# Patient Record
Sex: FEMALE | Race: BLACK OR AFRICAN AMERICAN | Employment: FULL TIME | URBAN - METROPOLITAN AREA
[De-identification: names, ages, dates, MRNs, and addresses within clinical notes are randomized per-mention and may not be internally consistent; named-entity substitution may affect disease eponyms.]

---

## 2017-05-30 ENCOUNTER — OP HISTORICAL/CONVERTED ENCOUNTER (OUTPATIENT)
Dept: OTHER | Age: 47
End: 2017-05-30

## 2017-07-11 ENCOUNTER — OP HISTORICAL/CONVERTED ENCOUNTER (OUTPATIENT)
Dept: OTHER | Age: 47
End: 2017-07-11

## 2018-02-27 ENCOUNTER — OP HISTORICAL/CONVERTED ENCOUNTER (OUTPATIENT)
Dept: OTHER | Age: 48
End: 2018-02-27

## 2018-03-01 ENCOUNTER — OP HISTORICAL/CONVERTED ENCOUNTER (OUTPATIENT)
Dept: OTHER | Age: 48
End: 2018-03-01

## 2018-08-21 ENCOUNTER — ED HISTORICAL/CONVERTED ENCOUNTER (OUTPATIENT)
Dept: OTHER | Age: 48
End: 2018-08-21

## 2019-03-21 LAB
MAMMOGRAPHY, EXTERNAL: NEGATIVE
PAP SMEAR, EXTERNAL: NEGATIVE

## 2020-07-22 ENCOUNTER — OFFICE VISIT (OUTPATIENT)
Dept: URGENT CARE | Age: 50
End: 2020-07-22

## 2020-07-22 VITALS — TEMPERATURE: 98 F | OXYGEN SATURATION: 97 % | HEART RATE: 86 BPM | RESPIRATION RATE: 17 BRPM

## 2020-07-22 DIAGNOSIS — Z20.822 ENCOUNTER FOR LABORATORY TESTING FOR COVID-19 VIRUS: Primary | ICD-10-CM

## 2020-07-22 NOTE — PROGRESS NOTES
This patient was seen in [facility] at [address] [place] [address] [facility]gent [facility] while in their vehicle due to COVID-19 pandemic with PPE and focused examination in order to decrease community viral transmission. The patient/guardian gave verbal consent to treat. The history is provided by the patient. Asymptomatic  [place]  History reviewed. No pertinent past medical history. History reviewed. No pertinent surgical history. History reviewed. No pertinent family history.      Social History     Socioeconomic History    Marital status:      Spouse name: Not on file    Number of children: Not on file    Years of education: Not on file    Highest education level: Not on file   Occupational History    Not on file   Social Needs    Financial resource strain: Not on file    Food insecurity     Worry: Not on file     Inability: Not on file    Transportation needs     Medical: Not on file     Non-medical: Not on file   Tobacco Use    Smoking status: Smoker, Current Status Unknown    Smokeless tobacco: Never Used   Substance and Sexual Activity    Alcohol use: Not on file    Drug use: Not on file    Sexual activity: Not on file   Lifestyle    Physical activity     Days per week: Not on file     Minutes per session: Not on file    Stress: Not on file   Relationships    Social connections     Talks on phone: Not on file     Gets together: Not on file     Attends Mandaen service: Not on file     Active member of club or organization: Not on file     Attends meetings of clubs or organizations: Not on file     Relationship status: Not on file    Intimate partner violence     Fear of current or ex partner: Not on file     Emotionally abused: Not on file     Physically abused: Not on file     Forced sexual activity: Not on file   Other Topics Concern    Not on file   Social History Narrative    Not on file                ALLERGIES: Patient has no known allergies. Review of Systems   All other systems reviewed and are negative. Vitals:    07/22/20 1251   Pulse: 86   Resp: 17   Temp: 98 °F (36.7 °C)   SpO2: 97%       Physical Exam  Vitals signs and nursing note reviewed. Constitutional:       General: She is not in acute distress. Appearance: She is not ill-appearing. Pulmonary:      Effort: Pulmonary effort is normal. No respiratory distress. Breath sounds: No wheezing. MDM    Procedures        ICD-10-CM ICD-9-CM    1. Encounter for laboratory testing for COVID-19 virus  Z11.59 V73.89 NOVEL CORONAVIRUS (COVID-19)        Tested for Covid-19 and advised to quarantine until result comes back. No orders of the defined types were placed in this encounter. No results found for any visits on 07/22/20. The patients condition was discussed with the patient and they understand. The patient is to follow up with primary care doctor. If signs and symptoms become worse the pt is to go to the ER. The patient is to take medications as prescribed.

## 2020-07-25 LAB — SARS-COV-2, NAA: NOT DETECTED

## 2020-07-25 NOTE — PROGRESS NOTES
Pt verified with two person identifiers. Pt notified test result(S). Pt understands results and/or instructions as per provider.

## 2020-08-19 ENCOUNTER — OFFICE VISIT (OUTPATIENT)
Dept: OBGYN CLINIC | Age: 50
End: 2020-08-19
Payer: COMMERCIAL

## 2020-08-19 VITALS
SYSTOLIC BLOOD PRESSURE: 118 MMHG | DIASTOLIC BLOOD PRESSURE: 80 MMHG | HEIGHT: 61 IN | WEIGHT: 216 LBS | BODY MASS INDEX: 40.78 KG/M2

## 2020-08-19 DIAGNOSIS — Z01.419 GYNECOLOGIC EXAM NORMAL: Primary | ICD-10-CM

## 2020-08-19 DIAGNOSIS — Z12.31 VISIT FOR SCREENING MAMMOGRAM: Primary | ICD-10-CM

## 2020-08-19 DIAGNOSIS — Z12.4 ENCOUNTER FOR SCREENING FOR MALIGNANT NEOPLASM OF CERVIX: ICD-10-CM

## 2020-08-19 DIAGNOSIS — N95.1 PERIMENOPAUSAL: ICD-10-CM

## 2020-08-19 PROCEDURE — 77067 SCR MAMMO BI INCL CAD: CPT | Performed by: OBSTETRICS & GYNECOLOGY

## 2020-08-19 PROCEDURE — 99396 PREV VISIT EST AGE 40-64: CPT | Performed by: OBSTETRICS & GYNECOLOGY

## 2020-08-19 RX ORDER — GLUCOSAMINE/CHONDR SU A SOD 750-600 MG
TABLET ORAL
COMMUNITY
End: 2022-04-16

## 2020-08-19 NOTE — PROGRESS NOTES
Cha Jasso is a 54 y.o. female, , No LMP recorded. (Menstrual status: Menopause). , who presents today for the following:  Chief Complaint   Patient presents with    Annual Exam        No Known Allergies    Current Outpatient Medications   Medication Sig    omega 3-dha-epa-fish oil 100-160-1,000 mg cap Take  by mouth.  Biotin 2,500 mcg cap Take  by mouth.  elderberry fruit-honey 0.7-3 gram/7.5 mL liqd Take  by mouth. No current facility-administered medications for this visit. Past Medical History:   Diagnosis Date    Hypercholesterolemia     Hypertension        Past Surgical History:   Procedure Laterality Date    HX CARPAL TUNNEL RELEASE      HX  SECTION      LAP,TUBAL CAUTERY         History reviewed. No pertinent family history. Social History     Socioeconomic History    Marital status:      Spouse name: Not on file    Number of children: Not on file    Years of education: Not on file    Highest education level: Not on file   Occupational History    Not on file   Social Needs    Financial resource strain: Not on file    Food insecurity     Worry: Not on file     Inability: Not on file    Transportation needs     Medical: Not on file     Non-medical: Not on file   Tobacco Use    Smoking status: Never Smoker    Smokeless tobacco: Never Used   Substance and Sexual Activity    Alcohol use:  Yes    Drug use: Never    Sexual activity: Yes     Partners: Male     Birth control/protection: Surgical   Lifestyle    Physical activity     Days per week: Not on file     Minutes per session: Not on file    Stress: Not on file   Relationships    Social connections     Talks on phone: Not on file     Gets together: Not on file     Attends Yazdanism service: Not on file     Active member of club or organization: Not on file     Attends meetings of clubs or organizations: Not on file     Relationship status: Not on file    Intimate partner violence     Fear of current or ex partner: Not on file     Emotionally abused: Not on file     Physically abused: Not on file     Forced sexual activity: Not on file   Other Topics Concern    Not on file   Social History Narrative    Not on file         HPI  Annual    Review of Systems   Constitutional: Negative. Respiratory: Negative. Cardiovascular: Negative. Gastrointestinal: Negative. Genitourinary: Negative. Musculoskeletal: Negative. Skin: Negative. Neurological: Negative. Endo/Heme/Allergies: Negative. Psychiatric/Behavioral: Negative. All other systems reviewed and are negative. /80 (BP 1 Location: Left arm, BP Patient Position: Sitting)   Ht 5' 1\" (1.549 m)   Wt 216 lb (98 kg)   BMI 40.81 kg/m²    OBGyn Exam     Constitutional:     General Appearance: healthy-appearing, well-nourished, and well-developed; Level of Distress: NAD. Ambulation: ambulating normally. Psychiatric:   Insight: good judgement. Mental Status: normal mood and affect and active and alert. Orientation: to time, place, and person. Memory: recent memory normal and remote memory normal.     Head: Head: normocephalic and atraumatic. Neck:   Neck: supple, FROM, trachea midline, and no masses. Lymph Nodes: no cervical LAD, supraclavicular LAD, axillary LAD, or inguinal LAD. Thyroid: no enlargement or nodules and non-tender. Lungs:   Respiratory effort: no dyspnea. Auscultation: no wheezing, rales/crackles, or rhonchi and breath sounds normal, good air movement, and CTA except as noted. Cardiovascular:   Heart Auscultation: normal S1 and S2; no murmurs, rubs, or gallops; and RRR. Pulses including femoral / pedal: normal throughout. Breast: Breast: no masses or abnormal secretions and normal appearance. Abdomen:  Inspection and Palpation: no tenderness, guarding, masses, rebound tenderness, or CVA tenderness and non-distended.        Female :   External genitalia: no lesions or rash and normal.   Vagina: moist mucosa;  Cervix: no discharge, inflammation, or cervical motion tenderness   Uterus: midline, smooth, and non-tender; normal size   Adnexae: no adnexal mass or tenderness and size WNL. Bladder and Urethra: normal bladder and urethra (except where noted). Musculoskeletal[de-identified]   Motor Strength and Tone: normal tone and motor strength. Joints, Bones, and Muscles: no contractures, malalignment, tenderness, or bony abnormalities and normal movement of all extremities. Extremities: no cyanosis, edema, varicosities, or palpable cord. Skin:   Inspection and palpation: no rash, lesions, ulcer, induration, nodules, jaundice, or abnormal nevi and good turgor. Nails: normal.     Back: Thoracolumbar Appearance: normal curvature. 1. Gynecologic exam normal    - PAP IG, CT-NG, RFX APTIMA HPV ASCUS ([id number], [id number]))    2. Encounter for screening for malignant neoplasm of cervix      3. Perimenopausal    - FSH AND LH  - ESTRADIOL, SERUM  - PROGESTERONE  - TSH 3RD GENERATION  - HIV 1/2 AG/AB, 4TH GENERATION,W RFLX CONFIRM  - RPR;  Future

## 2020-08-24 LAB
C TRACH RRNA CVX QL NAA+PROBE: NEGATIVE
CYTOLOGIST CVX/VAG CYTO: NORMAL
CYTOLOGY CVX/VAG DOC CYTO: NORMAL
CYTOLOGY CVX/VAG DOC THIN PREP: NORMAL
DX ICD CODE: NORMAL
LABCORP, 190119: NORMAL
Lab: NORMAL
N GONORRHOEA RRNA CVX QL NAA+PROBE: NEGATIVE
OTHER STN SPEC: NORMAL
STAT OF ADQ CVX/VAG CYTO-IMP: NORMAL

## 2020-08-25 LAB
ESTRADIOL SERPL HS-MCNC: 7.3 PG/ML
FSH SERPL-ACNC: 91.4 MIU/ML
HIV 1+2 AB+HIV1 P24 AG SERPL QL IA: NON REACTIVE
LH SERPL-ACNC: 57.1 MIU/ML
PROGEST SERPL-MCNC: 0.2 NG/ML
TSH SERPL DL<=0.005 MIU/L-ACNC: 6.46 UIU/ML (ref 0.45–4.5)

## 2020-08-28 ENCOUNTER — NURSE TRIAGE (OUTPATIENT)
Dept: OBGYN CLINIC | Age: 50
End: 2020-08-28

## 2020-08-28 NOTE — TELEPHONE ENCOUNTER
Patient called for lab results. Advised her I would speak with Dr Flores Favorite regarding her [facility] and . She requests her results be forwarded to her PCP.

## 2020-08-31 ENCOUNTER — VIRTUAL VISIT (OUTPATIENT)
Dept: FAMILY MEDICINE CLINIC | Age: 50
End: 2020-08-31
Payer: COMMERCIAL

## 2020-08-31 DIAGNOSIS — Z13.1 SCREENING FOR DIABETES MELLITUS: ICD-10-CM

## 2020-08-31 DIAGNOSIS — K21.9 GASTROESOPHAGEAL REFLUX DISEASE WITHOUT ESOPHAGITIS: Primary | ICD-10-CM

## 2020-08-31 DIAGNOSIS — K59.00 CONSTIPATION, UNSPECIFIED CONSTIPATION TYPE: ICD-10-CM

## 2020-08-31 DIAGNOSIS — R79.89 ELEVATED TSH: ICD-10-CM

## 2020-08-31 DIAGNOSIS — Z13.220 SCREENING FOR HYPERLIPIDEMIA: ICD-10-CM

## 2020-08-31 PROBLEM — Z98.84 HISTORY OF BARIATRIC SURGERY: Status: ACTIVE | Noted: 2020-08-31

## 2020-08-31 PROBLEM — I10 HYPERTENSION: Status: ACTIVE | Noted: 2020-08-31

## 2020-08-31 PROBLEM — E78.00 HYPERCHOLESTEREMIA: Status: ACTIVE | Noted: 2020-08-31

## 2020-08-31 PROCEDURE — 99213 OFFICE O/P EST LOW 20 MIN: CPT | Performed by: NURSE PRACTITIONER

## 2020-08-31 RX ORDER — POLYETHYLENE GLYCOL 3350 17 G/17G
POWDER, FOR SOLUTION ORAL
Qty: 60 PACKET | Refills: 5 | Status: SHIPPED | OUTPATIENT
Start: 2020-08-31 | End: 2022-04-16

## 2020-08-31 RX ORDER — PANTOPRAZOLE SODIUM 40 MG/1
40 TABLET, DELAYED RELEASE ORAL DAILY
Qty: 90 TAB | Refills: 1 | Status: SHIPPED | OUTPATIENT
Start: 2020-08-31 | End: 2020-10-12 | Stop reason: SINTOL

## 2020-08-31 RX ORDER — BISMUTH SUBSALICYLATE 262 MG
1 TABLET,CHEWABLE ORAL DAILY
COMMUNITY

## 2020-08-31 NOTE — PATIENT INSTRUCTIONS
Get your labs done in a fasting state. We will let you know the results via [employer]. Take the pantoprazole as we discussed. Let me know if you have questions or concerns.

## 2020-08-31 NOTE — PROGRESS NOTES
Subjective  Amberly Manrique is a 54 y.o. female. HPI: 54 yr old female presented for virtual appt via [employer] technology. Pt had a number of concerns today. Pt stated that she was seen by me a few years ago but I could not find any notes for our office. Did see records for visits w/ Dr. Lizet Godoy. Had a PAP and some labs done on 8/19/2020. She is having mild hot flashes. Menopause confirmed via lab tests per GYNE. She was taking HRT but she developed leg cramps which she attributed to the HRT so stopped. Pt stated that her TSH was elevated - per GYNE. She does not have hx of hypothyroidism. Will recheck lab. Dxes of hypercholesteremia. No labs in a long time so current status unknown. Will check labs. Pt stated that she is having epigastric pain w/ occasional burning in her throat for the last month. No trigger foods identified- happens w/ all kind of food. Has a hx of bariatric surgery done by Dr. Sybil Galo 4-5 yrs ago and has not had any f/u. Current BMI 40.8. Pt stated that she is currently working w/ a  and a nutritionist to get back on track w/ her weight. Also has hx of chronic constipation. Last BM 3 days ago. Has not tried any OTC treatments. Stated she is trying to increase her intake of fruits and vegetables. Review of Systems   Constitutional: Negative for chills, fever and malaise/fatigue. HENT: Negative for ear pain and sore throat. Eyes: Positive for blurred vision. Pt stated occurs after she is on computer for a long time. She intends to make an appt for an eye exam in near future. Respiratory: Negative for cough, hemoptysis, shortness of breath and wheezing. Cardiovascular: Negative for chest pain, palpitations and leg swelling. Gastrointestinal: Positive for abdominal pain, constipation and heartburn. Negative for blood in stool, diarrhea, nausea and vomiting. Genitourinary: Negative for dysuria. Musculoskeletal: Negative.     Skin: Negative for itching and rash. Neurological: Negative for dizziness, tingling, sensory change and headaches. Psychiatric/Behavioral: Negative. Objective  Physical Exam  Constitutional:       Appearance: Normal appearance. Comments: Morbidly obese   HENT:      Head: Normocephalic. Right Ear: External ear normal.      Left Ear: External ear normal.      Nose: Nose normal.   Eyes:      Conjunctiva/sclera: Conjunctivae normal.   Neck:      Musculoskeletal: Neck supple. Comments: Neck area appeared inlarged  Pulmonary:      Effort: Pulmonary effort is normal.   Musculoskeletal: Normal range of motion. Comments: Of visible extremities   Neurological:      General: No focal deficit present. Mental Status: She is alert and oriented to person, place, and time. Psychiatric:         Mood and Affect: Mood normal.         Behavior: Behavior normal.         Thought Content: Thought content normal.         Judgment: Judgment normal.      Assessment & Plan    ICD-10-CM ICD-9-CM    1. Gastroesophageal reflux disease without esophagitis  K21.9 530.81 pantoprazole (PROTONIX) 40 mg tablet      METABOLIC PANEL, COMPREHENSIVE      CBC WITH AUTOMATED DIFF      RETICULOCYTE COUNT      REFERRAL TO BARIATRIC SURGERY   2. Elevated TSH  R79.89 794.5 TSH 3RD GENERATION      T4, FREE   3. Constipation, unspecified constipation type  K59.00 564.00 polyethylene glycol (MIRALAX) 17 gram packet   4.  Screening for hyperlipidemia  Z13.220 V77.91 LIPID PANEL   5. Screening for diabetes mellitus  Z13.1 V77.1 HEMOGLOBIN A1C WITH EAG     Everette Busch NP

## 2020-09-03 LAB
ALBUMIN SERPL-MCNC: 4.2 G/DL (ref 3.8–4.8)
ALBUMIN/GLOB SERPL: 1.3 {RATIO} (ref 1.2–2.2)
ALP SERPL-CCNC: 61 IU/L (ref 39–117)
ALT SERPL-CCNC: 12 IU/L (ref 0–32)
AST SERPL-CCNC: 20 IU/L (ref 0–40)
BASOPHILS # BLD AUTO: 0.1 X10E3/UL (ref 0–0.2)
BASOPHILS NFR BLD AUTO: 1 %
BILIRUB SERPL-MCNC: 0.4 MG/DL (ref 0–1.2)
BUN SERPL-MCNC: 12 MG/DL (ref 6–24)
BUN/CREAT SERPL: 12 (ref 9–23)
CALCIUM SERPL-MCNC: 9.4 MG/DL (ref 8.7–10.2)
CHLORIDE SERPL-SCNC: 102 MMOL/L (ref 96–106)
CHOLEST SERPL-MCNC: 215 MG/DL (ref 100–199)
CO2 SERPL-SCNC: 25 MMOL/L (ref 20–29)
CREAT SERPL-MCNC: 0.97 MG/DL (ref 0.57–1)
EOSINOPHIL # BLD AUTO: 0.3 X10E3/UL (ref 0–0.4)
EOSINOPHIL NFR BLD AUTO: 4 %
ERYTHROCYTE [DISTWIDTH] IN BLOOD BY AUTOMATED COUNT: 12.7 % (ref 11.7–15.4)
EST. AVERAGE GLUCOSE BLD GHB EST-MCNC: 114 MG/DL
GLOBULIN SER CALC-MCNC: 3.3 G/DL (ref 1.5–4.5)
GLUCOSE SERPL-MCNC: 91 MG/DL (ref 65–99)
HBA1C MFR BLD: 5.6 % (ref 4.8–5.6)
HCT VFR BLD AUTO: 44.1 % (ref 34–46.6)
HDLC SERPL-MCNC: 56 MG/DL
HGB BLD-MCNC: 14.6 G/DL (ref 11.1–15.9)
IMM GRANULOCYTES # BLD AUTO: 0 X10E3/UL (ref 0–0.1)
IMM GRANULOCYTES NFR BLD AUTO: 0 %
LDLC SERPL CALC-MCNC: 145 MG/DL (ref 0–99)
LYMPHOCYTES # BLD AUTO: 2.9 X10E3/UL (ref 0.7–3.1)
LYMPHOCYTES NFR BLD AUTO: 37 %
MCH RBC QN AUTO: 29.9 PG (ref 26.6–33)
MCHC RBC AUTO-ENTMCNC: 33.1 G/DL (ref 31.5–35.7)
MCV RBC AUTO: 90 FL (ref 79–97)
MONOCYTES # BLD AUTO: 0.4 X10E3/UL (ref 0.1–0.9)
MONOCYTES NFR BLD AUTO: 5 %
NEUTROPHILS # BLD AUTO: 4.2 X10E3/UL (ref 1.4–7)
NEUTROPHILS NFR BLD AUTO: 53 %
PLATELET # BLD AUTO: 220 X10E3/UL (ref 150–450)
POTASSIUM SERPL-SCNC: 4.4 MMOL/L (ref 3.5–5.2)
PROT SERPL-MCNC: 7.5 G/DL (ref 6–8.5)
RBC # BLD AUTO: 4.89 X10E6/UL (ref 3.77–5.28)
RETICS/RBC NFR AUTO: 1.3 % (ref 0.6–2.6)
SODIUM SERPL-SCNC: 141 MMOL/L (ref 134–144)
T4 FREE SERPL-MCNC: 1.07 NG/DL (ref 0.82–1.77)
TRIGL SERPL-MCNC: 81 MG/DL (ref 0–149)
TSH SERPL DL<=0.005 MIU/L-ACNC: 8.72 UIU/ML (ref 0.45–4.5)
VLDLC SERPL CALC-MCNC: 14 MG/DL (ref 5–40)
WBC # BLD AUTO: 7.9 X10E3/UL (ref 3.4–10.8)

## 2020-09-07 DIAGNOSIS — E03.9 ACQUIRED HYPOTHYROIDISM: Primary | ICD-10-CM

## 2020-09-07 RX ORDER — LEVOTHYROXINE SODIUM 50 UG/1
50 TABLET ORAL
Qty: 90 TAB | Refills: 1 | Status: SHIPPED | OUTPATIENT
Start: 2020-09-07 | End: 2021-03-11

## 2020-09-08 NOTE — PROGRESS NOTES
Repeat TSH also elevated as the one done about 2 weeks ago was. Will send in an order for thyroid medication and an order for repeat thyroid testing in about 4 weeks. Will ask staff to mail to pt.

## 2020-09-14 DIAGNOSIS — N95.1 MENOPAUSAL AND FEMALE CLIMACTERIC STATES: Primary | ICD-10-CM

## 2020-09-14 RX ORDER — NORETHINDRONE ACETATE AND ETHINYL ESTRADIOL 1; .02 MG/1; MG/1
1 TABLET ORAL DAILY
Qty: 28 TAB | Refills: 11 | Status: SHIPPED | OUTPATIENT
Start: 2020-09-14 | End: 2021-03-08 | Stop reason: ALTCHOICE

## 2020-09-14 NOTE — PROGRESS NOTES
Thyroid medication was increased on 9/7/2020. Endorphin message sent today to verify she has picked up medication.

## 2020-09-21 ENCOUNTER — TELEPHONE (OUTPATIENT)
Dept: SURGERY | Age: 50
End: 2020-09-21

## 2020-09-21 ENCOUNTER — OFFICE VISIT (OUTPATIENT)
Dept: SURGERY | Age: 50
End: 2020-09-21
Payer: COMMERCIAL

## 2020-09-21 VITALS
DIASTOLIC BLOOD PRESSURE: 73 MMHG | TEMPERATURE: 98.6 F | HEIGHT: 61 IN | SYSTOLIC BLOOD PRESSURE: 103 MMHG | BODY MASS INDEX: 41.44 KG/M2 | OXYGEN SATURATION: 98 % | HEART RATE: 65 BPM | WEIGHT: 219.5 LBS

## 2020-09-21 DIAGNOSIS — Z98.84 STATUS POST BARIATRIC SURGERY: ICD-10-CM

## 2020-09-21 DIAGNOSIS — E66.01 OBESITY, MORBID (HCC): ICD-10-CM

## 2020-09-21 DIAGNOSIS — Z98.84 STATUS POST BARIATRIC SURGERY: Primary | ICD-10-CM

## 2020-09-21 PROCEDURE — 99213 OFFICE O/P EST LOW 20 MIN: CPT | Performed by: SURGERY

## 2020-09-21 RX ORDER — NORETHINDRONE ACETATE AND ETHINYL ESTRADIOL 1MG-20(21)
KIT ORAL
COMMUNITY
End: 2022-04-16

## 2020-09-21 NOTE — LETTER
9/21/20 Patient: Will Lung YOB: 1970 Date of Visit: 9/21/2020 Danitza Bell NP 
[address] [place] [id number] VIA In Basket Dear Danitza Bell NP, Thank you for referring Ms. Luisa Bella to [address] [place] [address] for evaluation. My notes for this consultation are attached. If you have questions, please do not hesitate to call me. I look forward to following your patient along with you. Sincerely, Anny Orona MD

## 2020-09-21 NOTE — PROGRESS NOTES
[facility]  [address], [address] [facility]  [phone]    Bariatric Surgery Follow up    Patient Name: Dung Rodriguez (15 y.o., female)    Patient Address: [place] [address]    PCP: Aspen Guerrero NP     Patient contact numbers:     Home Phone  Work Phone  Mobile    [phone]   Patient Allergies:  Patient has no known allergies. Temp: 98.6 °F (37 °C) (09/21/20 1418) Pulse (Heart Rate): 65 (09/21/20 1418) Resp: (not recorded) BP: 103/73 (09/21/20 1418) O2 Sat (%): 98 % (09/21/20 1418) Weight: 219 lb 8 oz (99.6 kg) (09/21/20 1418)     Current Outpatient Medications   Medication Sig Dispense Refill    norethindrone-ethinyl estradiol (Junel FE 1/20, 28,) 1 mg-20 mcg (21)/75 mg (7) tab Take  by mouth.  levothyroxine (SYNTHROID) 50 mcg tablet Take 1 Tab by mouth Daily (before breakfast). Indications: a condition with low thyroid hormone levels 90 Tab 1    pantoprazole (PROTONIX) 40 mg tablet Take 1 Tab by mouth daily. 90 Tab 1    polyethylene glycol (MIRALAX) 17 gram packet Take 1 packet (17 Gm) per label directions 2 x day until BMs normalize then cut back dosing as needed to maintain at least every other day BMs. Stay well hydrated. 60 Packet 5    multivitamin (ONE A DAY) tablet Take 1 Tab by mouth daily.  omega 3-dha-epa-fish oil 100-160-1,000 mg cap Take  by mouth.  Biotin 2,500 mcg cap Take  by mouth.  norethindrone-ethinyl estradiol (Loestrin 1/20, 21,) 1-20 mg-mcg tablet Take 1 Tab by mouth daily for 336 days.  28 Tab 11        Patient Active Problem List   Diagnosis Code    Status post bariatric surgery Z98.84    Hypertension I10    Hypercholesteremia E78.00    Obesity, morbid ([address] E66.01       Family History   Problem Relation Age of Onset    Heart Disease Father        Social History     Socioeconomic History    Marital status:      Spouse name: Not on file    Number of children: Not on file    Years of education: Not on file    Highest education level: Not on file   Occupational History    Not on file   Social Needs    Financial resource strain: Not on file    Food insecurity     Worry: Not on file     Inability: Not on file    Transportation needs     Medical: Not on file     Non-medical: Not on file   Tobacco Use    Smoking status: Never Smoker    Smokeless tobacco: Never Used   Substance and Sexual Activity    Alcohol use: Yes    Drug use: Never    Sexual activity: Yes     Partners: Male     Birth control/protection: Surgical   Lifestyle    Physical activity     Days per week: Not on file     Minutes per session: Not on file    Stress: Not on file   Relationships    Social connections     Talks on phone: Not on file     Gets together: Not on file     Attends Jew service: Not on file     Active member of club or organization: Not on file     Attends meetings of clubs or organizations: Not on file     Relationship status: Not on file    Intimate partner violence     Fear of current or ex partner: Not on file     Emotionally abused: Not on file     Physically abused: Not on file     Forced sexual activity: Not on file   Other Topics Concern    Not on file   Social History Narrative    Not on file       Past Surgical History:   Procedure Laterality Date    HX CARPAL TUNNEL RELEASE      HX  SECTION      HX GI      gastric sleeves    LAP,TUBAL CAUTERY         Past Medical History:   Diagnosis Date    Hypercholesterolemia     Hypertension            History of Present Illness  2020  Patient presents for follow-up after sleeve gastrectomy on 10/4/2016. She moved and recently returned back to the area which is the reason for her being lost to follow-up. Over the past few months she has noticed worsening reflux. This is resulted in her increasing her carbohydrate intake. This has subsequently resulted in about a 20 pound weight gain.   She wakes up in the middle of the night choking on acid. She has been on Protonix without significant improvement of her symptoms. She has also had issues with her thyroid and is started thyroid replacement therapy. She also is having issues with her menses and menopause which is currently being controlled and managed by her gynecologist.  She is taking her vitamins and supplements as directed. She is on a strict diet as prescribed by her nutritionist.  She is also working with a . Review of Systems  Review of Systems   Constitutional: Negative for chills and fever. HENT: Negative for ear pain, hearing loss, nosebleeds, sinus pain and tinnitus. Eyes: Negative for blurred vision and pain. Respiratory: Negative for cough, hemoptysis, shortness of breath and wheezing. Cardiovascular: Negative for chest pain, palpitations and leg swelling. Gastrointestinal: Negative for abdominal pain, blood in stool, constipation, diarrhea, heartburn, melena, nausea and vomiting. Genitourinary: Negative for dysuria, frequency and hematuria. Musculoskeletal: Negative for back pain, joint pain and myalgias. Skin: Negative for itching and rash. Neurological: Negative for dizziness, tingling, tremors, loss of consciousness and headaches. Endo/Heme/Allergies: Does not bruise/bleed easily. Psychiatric/Behavioral: Negative for depression and substance abuse. The patient is not nervous/anxious. Physical Exam  Visit Vitals  /73 (BP 1 Location: Left arm, BP Patient Position: Sitting)   Pulse 65   Temp 98.6 °F (37 °C)   Ht 5' 1\" (1.549 m)   Wt 219 lb 8 oz (99.6 kg)   SpO2 98%   BMI 41.47 kg/m²     General:  Alert, cooperative, no distress. Head:  Normocephalic, without obvious abnormality, atraumatic. Eyes:  Conjunctivae/corneas clear. Pupils equal, round, reactive to light. Extraocular movements intact. Lungs:   Clear to auscultation bilaterally. Chest wall:  No tenderness or deformity.    Heart: Regular rate and rhythm, S1, S2 normal, no murmur, click, rub, or gallop. Abdomen:   Soft, non-tender. Bowel sounds normal. No masses. No organomegaly. Extremities: Extremities normal, atraumatic, no cyanosis or edema. Pulses: 2+ and symmetric all extremities. Skin: Skin color, texture, turgor normal. No rashes or lesions. Lymph nodes: Cervical, supraclavicular, and axillary nodes normal.   Neurologic: CNII-XII intact. Normal strength, sensation, and reflexes throughout. Assessment    Problem List Items Addressed This Visit        Other    Status post bariatric surgery - Primary    Obesity, morbid ([facility] [address]          Plan  46-year-old female with reflux, status post sleeve gastrectomy. 1.  Obtain barium swallow to evaluate reflux  2. Check nutritional lab work  3.   Follow-up after barium swallow is complete

## 2020-09-21 NOTE — TELEPHONE ENCOUNTER
Pt states dr Nathen Wheeler wants her to have a [facility] swollow, someone from [place] called to schedule her, but pt wants to go to [facility]. Please send order there.  Thank you

## 2020-09-24 LAB
25(OH)D3+25(OH)D2 SERPL-MCNC: 45.2 NG/ML (ref 30–100)
FERRITIN SERPL-MCNC: 47 NG/ML (ref 15–150)
IRON SATN MFR SERPL: 20 % (ref 15–55)
IRON SERPL-MCNC: 62 UG/DL (ref 27–159)
PREALB SERPL-MCNC: 24 MG/DL (ref 12–34)
TIBC SERPL-MCNC: 303 UG/DL (ref 250–450)
UIBC SERPL-MCNC: 241 UG/DL (ref 131–425)
VIT B1 BLD-SCNC: 110.5 NMOL/L (ref 66.5–200)
VIT B12 SERPL-MCNC: 540 PG/ML (ref 232–1245)

## 2020-09-25 ENCOUNTER — HOSPITAL ENCOUNTER (OUTPATIENT)
Dept: GENERAL RADIOLOGY | Age: 50
Discharge: HOME | End: 2020-09-25
Attending: SURGERY
Payer: COMMERCIAL

## 2020-09-25 DIAGNOSIS — Z98.84 STATUS POST BARIATRIC SURGERY: ICD-10-CM

## 2020-09-25 PROCEDURE — 74220 X-RAY XM ESOPHAGUS 1CNTRST: CPT

## 2020-09-28 NOTE — PROGRESS NOTES
Patient has a moderate sized hiatal hernia. Please schedule upper endoscopy at patient's convenience to evaluate hernia. Patient may need either hiatal hernia repair vs. Conversion to gastric bypass.

## 2020-10-02 ENCOUNTER — TELEPHONE (OUTPATIENT)
Dept: SURGERY | Age: 50
End: 2020-10-02

## 2020-10-02 NOTE — TELEPHONE ENCOUNTER
Brian العراقي wanted to know if we have authorization for pt's EGD on Tuesday. She needs to know the status of the authoriztion. Please call her.  Thank you

## 2020-10-03 ENCOUNTER — HOSPITAL ENCOUNTER (OUTPATIENT)
Dept: PREADMISSION TESTING | Age: 50
Discharge: HOME | End: 2020-10-03
Payer: COMMERCIAL

## 2020-10-03 LAB — SARS-COV-2, COV2: NORMAL

## 2020-10-03 PROCEDURE — 87635 SARS-COV-2 COVID-19 AMP PRB: CPT

## 2020-10-04 LAB — SARS-COV-2, COV2NT: NOT DETECTED

## 2020-10-06 ENCOUNTER — ANESTHESIA EVENT (OUTPATIENT)
Dept: ENDOSCOPY | Age: 50
End: 2020-10-06
Payer: COMMERCIAL

## 2020-10-07 ENCOUNTER — HOSPITAL ENCOUNTER (OUTPATIENT)
Age: 50
Setting detail: OUTPATIENT SURGERY
Discharge: HOME | End: 2020-10-07
Attending: SURGERY | Admitting: SURGERY
Payer: COMMERCIAL

## 2020-10-07 ENCOUNTER — APPOINTMENT (OUTPATIENT)
Dept: ENDOSCOPY | Age: 50
End: 2020-10-07
Attending: SURGERY
Payer: COMMERCIAL

## 2020-10-07 ENCOUNTER — ANESTHESIA (OUTPATIENT)
Dept: ENDOSCOPY | Age: 50
End: 2020-10-07
Payer: COMMERCIAL

## 2020-10-07 VITALS
OXYGEN SATURATION: 100 % | RESPIRATION RATE: 18 BRPM | HEART RATE: 56 BPM | BODY MASS INDEX: 41.53 KG/M2 | WEIGHT: 219.8 LBS | DIASTOLIC BLOOD PRESSURE: 80 MMHG | SYSTOLIC BLOOD PRESSURE: 120 MMHG | TEMPERATURE: 98 F

## 2020-10-07 LAB
H PYLORI FROM TISSUE: NEGATIVE
KIT LOT NO., HCLOLOT: NORMAL
NEGATIVE CONTROL: NEGATIVE
POSITIVE CONTROL: POSITIVE

## 2020-10-07 PROCEDURE — 74011000250 HC RX REV CODE- 250: Performed by: NURSE ANESTHETIST, CERTIFIED REGISTERED

## 2020-10-07 PROCEDURE — 87077 CULTURE AEROBIC IDENTIFY: CPT | Performed by: SURGERY

## 2020-10-07 PROCEDURE — 76040000019: Performed by: SURGERY

## 2020-10-07 PROCEDURE — 76060000031 HC ANESTHESIA FIRST 0.5 HR: Performed by: SURGERY

## 2020-10-07 PROCEDURE — 74011250636 HC RX REV CODE- 250/636: Performed by: NURSE ANESTHETIST, CERTIFIED REGISTERED

## 2020-10-07 PROCEDURE — 2709999900 HC NON-CHARGEABLE SUPPLY: Performed by: SURGERY

## 2020-10-07 PROCEDURE — 77030019988 HC FCPS ENDOSC DISP BSC -B: Performed by: SURGERY

## 2020-10-07 RX ORDER — PROPOFOL 10 MG/ML
INJECTION, EMULSION INTRAVENOUS
Status: COMPLETED
Start: 2020-10-07 | End: 2020-10-07

## 2020-10-07 RX ORDER — SODIUM CHLORIDE 9 MG/ML
INJECTION, SOLUTION INTRAVENOUS
Status: DISCONTINUED | OUTPATIENT
Start: 2020-10-07 | End: 2020-10-07 | Stop reason: HOSPADM

## 2020-10-07 RX ORDER — LIDOCAINE HYDROCHLORIDE 20 MG/ML
INJECTION, SOLUTION EPIDURAL; INFILTRATION; INTRACAUDAL; PERINEURAL AS NEEDED
Status: DISCONTINUED | OUTPATIENT
Start: 2020-10-07 | End: 2020-10-07 | Stop reason: HOSPADM

## 2020-10-07 RX ORDER — PROPOFOL 10 MG/ML
INJECTION, EMULSION INTRAVENOUS AS NEEDED
Status: DISCONTINUED | OUTPATIENT
Start: 2020-10-07 | End: 2020-10-07 | Stop reason: HOSPADM

## 2020-10-07 RX ORDER — LIDOCAINE HYDROCHLORIDE 20 MG/ML
INJECTION, SOLUTION EPIDURAL; INFILTRATION; INTRACAUDAL; PERINEURAL
Status: COMPLETED
Start: 2020-10-07 | End: 2020-10-07

## 2020-10-07 RX ORDER — SODIUM CHLORIDE, SODIUM LACTATE, POTASSIUM CHLORIDE, CALCIUM CHLORIDE 600; 310; 30; 20 MG/100ML; MG/100ML; MG/100ML; MG/100ML
50 INJECTION, SOLUTION INTRAVENOUS CONTINUOUS
Status: DISCONTINUED | OUTPATIENT
Start: 2020-10-07 | End: 2020-10-07 | Stop reason: HOSPADM

## 2020-10-07 RX ADMIN — PROPOFOL 50 MG: 10 INJECTION, EMULSION INTRAVENOUS at 08:27

## 2020-10-07 RX ADMIN — PROPOFOL 100 MG: 10 INJECTION, EMULSION INTRAVENOUS at 08:28

## 2020-10-07 RX ADMIN — PROPOFOL 50 MG: 10 INJECTION, EMULSION INTRAVENOUS at 08:30

## 2020-10-07 RX ADMIN — LIDOCAINE HYDROCHLORIDE 40 MG: 20 INJECTION, SOLUTION EPIDURAL; INFILTRATION; INTRACAUDAL; PERINEURAL at 08:29

## 2020-10-07 RX ADMIN — SODIUM CHLORIDE: 9 INJECTION, SOLUTION INTRAVENOUS at 08:15

## 2020-10-07 RX ADMIN — LIDOCAINE HYDROCHLORIDE 60 MG: 20 INJECTION, SOLUTION EPIDURAL; INFILTRATION; INTRACAUDAL; PERINEURAL at 08:27

## 2020-10-07 NOTE — ANESTHESIA PREPROCEDURE EVALUATION
Relevant Problems   No relevant active problems       Anesthetic History   No history of anesthetic complications            Review of Systems / Medical History  Patient summary reviewed, nursing notes reviewed and pertinent labs reviewed    Pulmonary  Within defined limits                 Neuro/Psych              Cardiovascular    Hypertension                   GI/Hepatic/Renal     GERD      Hiatal hernia     Endo/Other      Hypothyroidism  Morbid obesity     Other Findings   Comments: S/P SLEEVE GASTRECTOMY.           Physical Exam    Airway  Mallampati: IV           Cardiovascular    Rhythm: regular  Rate: normal         Dental         Pulmonary      Decreased breath sounds           Abdominal         Other Findings            Anesthetic Plan    ASA: 3  Anesthesia type: total IV anesthesia and general - backup          Induction: Intravenous  Anesthetic plan and risks discussed with: Patient

## 2020-10-07 NOTE — PERIOP NOTES
Dr. Delroy Correa did not come to talk to family or pt. States he will call them later. Pt verbalized understanding, given endo phone number to call back if they do not hear from him.

## 2020-10-08 NOTE — ANESTHESIA POSTPROCEDURE EVALUATION
Procedure(s):  ESOPHAGOGASTRODUODENOSCOPY (EGD).     total IV anesthesia, general - backup    Anesthesia Post Evaluation      Multimodal analgesia: multimodal analgesia used between 6 hours prior to anesthesia start to PACU discharge  Patient location during evaluation: PACU  Patient participation: complete - patient participated  Level of consciousness: awake  Pain score: 0  Pain management: adequate  Airway patency: patent  Anesthetic complications: no  Cardiovascular status: acceptable  Respiratory status: acceptable  Hydration status: acceptable  Post anesthesia nausea and vomiting:  controlled  Final Post Anesthesia Temperature Assessment:  Normothermia (36.0-37.5 degrees C)      INITIAL Post-op Vital signs:   Vitals Value Taken Time   /80 10/7/2020  9:13 AM   Temp 36.7 °C (98 °F) 10/7/2020  8:58 AM   Pulse 56 10/7/2020  9:13 AM   Resp 18 10/7/2020  9:13 AM   SpO2 100 % 10/7/2020  9:13 AM

## 2020-10-12 ENCOUNTER — OFFICE VISIT (OUTPATIENT)
Dept: SURGERY | Age: 50
End: 2020-10-12
Payer: COMMERCIAL

## 2020-10-12 VITALS
DIASTOLIC BLOOD PRESSURE: 81 MMHG | SYSTOLIC BLOOD PRESSURE: 111 MMHG | BODY MASS INDEX: 41.07 KG/M2 | WEIGHT: 217.5 LBS | TEMPERATURE: 97.8 F | OXYGEN SATURATION: 98 % | HEART RATE: 62 BPM | HEIGHT: 61 IN

## 2020-10-12 DIAGNOSIS — Z98.84 STATUS POST BARIATRIC SURGERY: Primary | ICD-10-CM

## 2020-10-12 DIAGNOSIS — K21.9 GASTROESOPHAGEAL REFLUX DISEASE WITHOUT ESOPHAGITIS: ICD-10-CM

## 2020-10-12 PROCEDURE — 99213 OFFICE O/P EST LOW 20 MIN: CPT | Performed by: SURGERY

## 2020-10-12 RX ORDER — ESOMEPRAZOLE MAGNESIUM 20 MG/1
20 FOR SUSPENSION ORAL DAILY
Qty: 30 PACKET | Refills: 0 | Status: SHIPPED | OUTPATIENT
Start: 2020-10-12 | End: 2020-11-11

## 2020-10-12 RX ORDER — SUCRALFATE 1 G/10ML
1 SUSPENSION ORAL 4 TIMES DAILY
Qty: 560 ML | Refills: 0 | Status: SHIPPED | OUTPATIENT
Start: 2020-10-12 | End: 2020-10-26

## 2020-10-12 NOTE — PROGRESS NOTES
[facility]  [address], [address] [facility]  [phone]    Bariatric Surgery Follow up    Patient Name: Bob Guerrero (89 y.o., female)    Patient Address: [place] [address]    PCP: Harrison Barone NP     Patient contact numbers:     Home Phone  Work Phone  Mobile    [phone]   Patient Allergies:  Patient has no known allergies. Temp: (not recorded) Pulse: (not recorded) Resp: (not recorded) BP: (not recorded) SpO2: (not recorded) Weight: (not recorded)     Current Outpatient Medications   Medication Sig Dispense Refill    norethindrone-ethinyl estradiol (Junel FE 1/20, 28,) 1 mg-20 mcg (21)/75 mg (7) tab Take  by mouth.  norethindrone-ethinyl estradiol (Loestrin 1/20, 21,) 1-20 mg-mcg tablet Take 1 Tab by mouth daily for 336 days. 28 Tab 11    levothyroxine (SYNTHROID) 50 mcg tablet Take 1 Tab by mouth Daily (before breakfast). Indications: a condition with low thyroid hormone levels 90 Tab 1    pantoprazole (PROTONIX) 40 mg tablet Take 1 Tab by mouth daily. 90 Tab 1    polyethylene glycol (MIRALAX) 17 gram packet Take 1 packet (17 Gm) per label directions 2 x day until BMs normalize then cut back dosing as needed to maintain at least every other day BMs. Stay well hydrated. 60 Packet 5    multivitamin (ONE A DAY) tablet Take 1 Tab by mouth daily.  omega 3-dha-epa-fish oil 100-160-1,000 mg cap Take  by mouth.  Biotin 2,500 mcg cap Take  by mouth.           Patient Active Problem List   Diagnosis Code    Status post bariatric surgery Z98.84    Hypertension I10    Hypercholesteremia E78.00    Obesity, morbid ([facility] [address] E66.01       Family History   Problem Relation Age of Onset    Heart Disease Father        Social History     Socioeconomic History    Marital status:      Spouse name: Not on file    Number of children: Not on file    Years of education: Not on file    Highest education level: Not on file   Occupational History    Not on file   Social Needs    Financial resource strain: Not on file    Food insecurity     Worry: Not on file     Inability: Not on file    Transportation needs     Medical: Not on file     Non-medical: Not on file   Tobacco Use    Smoking status: Never Smoker    Smokeless tobacco: Never Used   Substance and Sexual Activity    Alcohol use: Yes    Drug use: Never    Sexual activity: Yes     Partners: Male     Birth control/protection: Surgical   Lifestyle    Physical activity     Days per week: Not on file     Minutes per session: Not on file    Stress: Not on file   Relationships    Social connections     Talks on phone: Not on file     Gets together: Not on file     Attends Gnosticist service: Not on file     Active member of club or organization: Not on file     Attends meetings of clubs or organizations: Not on file     Relationship status: Not on file    Intimate partner violence     Fear of current or ex partner: Not on file     Emotionally abused: Not on file     Physically abused: Not on file     Forced sexual activity: Not on file   Other Topics Concern    Not on file   Social History Narrative    Not on file       Past Surgical History:   Procedure Laterality Date    HX CARPAL TUNNEL RELEASE      HX  SECTION      HX GI      gastric sleeves    LAP,TUBAL CAUTERY         Past Medical History:   Diagnosis Date    GERD (gastroesophageal reflux disease)     Hiatal hernia     Hypercholesterolemia     Hypertension     Thyroid disease            History of Present Illness  2020  Patient presents for follow-up after sleeve gastrectomy on 10/4/2016. She moved and recently returned back to the area which is the reason for her being lost to follow-up. Over the past few months she has noticed worsening reflux. This is resulted in her increasing her carbohydrate intake. This has subsequently resulted in about a 20 pound weight gain.   She wakes up in the middle of the night choking on acid. She has been on Protonix without significant improvement of her symptoms. She has also had issues with her thyroid and is started thyroid replacement therapy. She also is having issues with her menses and menopause which is currently being controlled and managed by her gynecologist.  She is taking her vitamins and supplements as directed. She is on a strict diet as prescribed by her nutritionist.  She is also working with a . 10/12/2020  Patient returns to the office for routine follow-up. She continues to have severe reflux. She has tried Protonix in the past but feels that she gets a facial rash once she takes it. This has resulted in her avoiding use of the Protonix. She also complains of some mild abdominal pain. She denies any fevers or chills. She denies any hospitalizations or surgeries. Review of Systems  Review of Systems   Constitutional: Negative for chills and fever. HENT: Negative for ear pain, hearing loss, nosebleeds, sinus pain and tinnitus. Eyes: Negative for blurred vision and pain. Respiratory: Negative for cough, hemoptysis, shortness of breath and wheezing. Cardiovascular: Negative for chest pain, palpitations and leg swelling. Gastrointestinal: Negative for abdominal pain, blood in stool, constipation, diarrhea, heartburn, melena, nausea and vomiting. Genitourinary: Negative for dysuria, frequency and hematuria. Musculoskeletal: Negative for back pain, joint pain and myalgias. Skin: Negative for itching and rash. Neurological: Negative for dizziness, tingling, tremors, loss of consciousness and headaches. Endo/Heme/Allergies: Does not bruise/bleed easily. Psychiatric/Behavioral: Negative for depression and substance abuse. The patient is not nervous/anxious. Physical Exam  There were no vitals taken for this visit. General:  Alert, cooperative, no distress.    Head:  Normocephalic, without obvious abnormality, atraumatic. Eyes:  Conjunctivae/corneas clear. Pupils equal, round, reactive to light. Extraocular movements intact. Lungs:   Clear to auscultation bilaterally. Chest wall:  No tenderness or deformity. Heart:  Regular rate and rhythm, S1, S2 normal, no murmur, click, rub, or gallop. Abdomen:   Soft, non-tender. Bowel sounds normal. No masses. No organomegaly. Extremities: Extremities normal, atraumatic, no cyanosis or edema. Pulses: 2+ and symmetric all extremities. Skin: Skin color, texture, turgor normal. No rashes or lesions. Lymph nodes: Cervical, supraclavicular, and axillary nodes normal.   Neurologic: CNII-XII intact. Normal strength, sensation, and reflexes throughout. Assessment    Problem List Items Addressed This Visit     None          Plan  79-year-old female with reflux, status post sleeve gastrectomy. 1.  Patient underwent recent upper endoscopy demonstrating a normal-appearing sleeve and a small hiatal hernia  2. Start Nexium, Carafate  3. Virtual visit in 2 weeks to evaluate reflux symptoms on new regimen.

## 2020-10-12 NOTE — LETTER
10/12/20 Patient: Cooper Ceron YOB: 1970 Date of Visit: 10/12/2020 Itz Hendricks NP 
[address] [place] [address] VIA In Basket Dear Itz Hendricks NP, Thank you for referring Ms. Usman Bernard to [address] [place] [address] for evaluation. My notes for this consultation are attached. If you have questions, please do not hesitate to call me. I look forward to following your patient along with you. Sincerely, Saadia Lam MD

## 2020-10-22 VITALS
SYSTOLIC BLOOD PRESSURE: 126 MMHG | HEIGHT: 61 IN | BODY MASS INDEX: 39.46 KG/M2 | HEART RATE: 84 BPM | WEIGHT: 209 LBS | TEMPERATURE: 98.4 F | DIASTOLIC BLOOD PRESSURE: 80 MMHG

## 2020-10-22 PROBLEM — N76.0 VAGINITIS: Status: ACTIVE | Noted: 2020-10-22

## 2020-10-22 PROBLEM — R30.0 DYSURIA: Status: ACTIVE | Noted: 2020-10-22

## 2020-10-22 PROBLEM — K59.00 CONSTIPATION: Status: ACTIVE | Noted: 2020-10-22

## 2020-10-22 RX ORDER — ONDANSETRON HYDROCHLORIDE 8 MG/1
8 TABLET, FILM COATED ORAL
COMMUNITY
End: 2022-04-16

## 2020-10-22 RX ORDER — DOCUSATE SODIUM 100 MG/1
100 CAPSULE, LIQUID FILLED ORAL 2 TIMES DAILY
COMMUNITY
End: 2022-04-16

## 2020-10-22 RX ORDER — OSELTAMIVIR PHOSPHATE 75 MG/1
CAPSULE ORAL
COMMUNITY
End: 2022-04-16

## 2020-10-22 RX ORDER — TRAMADOL HYDROCHLORIDE 50 MG/1
50 TABLET ORAL
COMMUNITY
End: 2022-04-16

## 2020-10-22 RX ORDER — FLUCONAZOLE 150 MG/1
150 TABLET ORAL DAILY
COMMUNITY
End: 2022-04-16

## 2020-10-22 RX ORDER — MELOXICAM 7.5 MG/1
TABLET ORAL DAILY
COMMUNITY
End: 2022-04-16

## 2020-10-22 RX ORDER — CYCLOBENZAPRINE HCL 10 MG
TABLET ORAL
COMMUNITY
End: 2022-04-16

## 2020-10-22 RX ORDER — KETOROLAC TROMETHAMINE 10 MG/1
TABLET, FILM COATED ORAL
COMMUNITY
End: 2022-04-16

## 2020-10-22 RX ORDER — AMOXICILLIN 500 MG/1
500 CAPSULE ORAL
COMMUNITY
End: 2021-03-08 | Stop reason: ALTCHOICE

## 2020-10-22 RX ORDER — ALBUTEROL SULFATE 90 UG/1
AEROSOL, METERED RESPIRATORY (INHALATION)
COMMUNITY
End: 2022-04-16

## 2020-10-22 RX ORDER — URSODIOL 300 MG/1
300 CAPSULE ORAL 2 TIMES DAILY
COMMUNITY
End: 2022-04-16

## 2020-10-29 ENCOUNTER — VIRTUAL VISIT (OUTPATIENT)
Dept: SURGERY | Age: 50
End: 2020-10-29
Payer: COMMERCIAL

## 2020-10-29 DIAGNOSIS — K21.9 GASTROESOPHAGEAL REFLUX DISEASE WITHOUT ESOPHAGITIS: Primary | ICD-10-CM

## 2020-10-29 DIAGNOSIS — Z98.84 STATUS POST BARIATRIC SURGERY: ICD-10-CM

## 2020-10-29 PROCEDURE — 99024 POSTOP FOLLOW-UP VISIT: CPT | Performed by: SURGERY

## 2020-10-29 RX ORDER — SUCRALFATE 1 G/10ML
SUSPENSION ORAL 4 TIMES DAILY
COMMUNITY
End: 2022-04-21

## 2020-10-29 NOTE — PROGRESS NOTES
[facility]  [address], [address] [facility]  [phone]    Bariatric Surgery Follow up    Patient Name: Jason Hahn (10 y.o., female)    Patient Address: [place] [id number]    PCP: Sherin Dexter NP     Patient contact numbers:     Home Phone  Work Phone  Mobile    [phone]   Patient Allergies:  Patient has no known allergies. Temp: (not recorded) Pulse: (not recorded) Resp: (not recorded) BP: (not recorded) SpO2: (not recorded) Weight: (not recorded)     Current Outpatient Medications   Medication Sig Dispense Refill    sucralfate (CARAFATE) 100 mg/mL suspension Take  by mouth four (4) times daily.  Esomeprazole Magnesium Take 1 Packet by mouth daily for 30 days. 30 Packet 0    norethindrone-ethinyl estradiol (Junel FE 1/20, 28,) 1 mg-20 mcg (21)/75 mg (7) tab Take  by mouth.  levothyroxine (SYNTHROID) 50 mcg tablet Take 1 Tab by mouth Daily (before breakfast). Indications: a condition with low thyroid hormone levels 90 Tab 1    polyethylene glycol (MIRALAX) 17 gram packet Take 1 packet (17 Gm) per label directions 2 x day until BMs normalize then cut back dosing as needed to maintain at least every other day BMs. Stay well hydrated. 60 Packet 5    multivitamin (ONE A DAY) tablet Take 1 Tab by mouth daily.  omega 3-dha-epa-fish oil 100-160-1,000 mg cap Take  by mouth.  amoxicillin (AMOXIL) 500 mg capsule Take 500 mg by mouth.  calcium carbonate/vitamin D3 (CALCIUM 500 + D PO) Take  by mouth.  docusate sodium (Colace) 100 mg capsule Take 100 mg by mouth two (2) times a day.  cyclobenzaprine (FLEXERIL) 10 mg tablet Take  by mouth three (3) times daily as needed for Muscle Spasm(s).  fluconazole (DIFLUCAN) 150 mg tablet Take 150 mg by mouth daily. FDA advises cautious prescribing of oral fluconazole in pregnancy.  ketorolac (TORADOL) 10 mg tablet Take  by mouth.       meloxicam (MOBIC) 7.5 mg tablet Take  by mouth daily.  ondansetron hcl (ZOFRAN) 8 mg tablet Take 8 mg by mouth every eight (8) hours as needed for Nausea or Vomiting.  oseltamivir (TAMIFLU) 75 mg capsule Take  by mouth.  traMADoL (ULTRAM) 50 mg tablet Take 50 mg by mouth every six (6) hours as needed for Pain.  ursodioL (ACTIGALL) 300 mg capsule Take 300 mg by mouth two (2) times a day.  albuterol (Ventolin HFA) 90 mcg/actuation inhaler Take  by inhalation.  norethindrone-ethinyl estradiol (Loestrin 1/20, 21,) 1-20 mg-mcg tablet Take 1 Tab by mouth daily for 336 days. 28 Tab 11    Biotin 2,500 mcg cap Take  by mouth. Patient Active Problem List   Diagnosis Code    Status post bariatric surgery Z98.84    Hypertension I10    Hypercholesteremia E78.00    Obesity, morbid ([facility] [address] E66.01    Gastroesophageal reflux disease without esophagitis K21.9    Constipation K59.00    Dysuria R30.0    Vaginitis N76.0       Family History   Problem Relation Age of Onset    Heart Disease Father        Social History     Socioeconomic History    Marital status:      Spouse name: Not on file    Number of children: Not on file    Years of education: Not on file    Highest education level: Not on file   Occupational History    Not on file   Social Needs    Financial resource strain: Not on file    Food insecurity     Worry: Not on file     Inability: Not on file    Transportation needs     Medical: Not on file     Non-medical: Not on file   Tobacco Use    Smoking status: Never Smoker    Smokeless tobacco: Never Used   Substance and Sexual Activity    Alcohol use:  Yes    Drug use: Never    Sexual activity: Yes     Partners: Male     Birth control/protection: Surgical   Lifestyle    Physical activity     Days per week: Not on file     Minutes per session: Not on file    Stress: Not on file   Relationships    Social connections     Talks on phone: Not on file     Gets together: Not on file     Attends Confucianist service: Not on file     Active member of club or organization: Not on file     Attends meetings of clubs or organizations: Not on file     Relationship status: Not on file    Intimate partner violence     Fear of current or ex partner: Not on file     Emotionally abused: Not on file     Physically abused: Not on file     Forced sexual activity: Not on file   Other Topics Concern    Not on file   Social History Narrative    Not on file       Past Surgical History:   Procedure Laterality Date    HX CARPAL TUNNEL RELEASE      HX  SECTION      HX GI      gastric sleeves    LAP,TUBAL CAUTERY         Past Medical History:   Diagnosis Date    GERD (gastroesophageal reflux disease)     Hiatal hernia     Hypercholesterolemia     Hypertension     Thyroid disease            History of Present Illness  2020  Patient presents for follow-up after sleeve gastrectomy on 10/4/2016. She moved and recently returned back to the area which is the reason for her being lost to follow-up. Over the past few months she has noticed worsening reflux. This is resulted in her increasing her carbohydrate intake. This has subsequently resulted in about a 20 pound weight gain. She wakes up in the middle of the night choking on acid. She has been on Protonix without significant improvement of her symptoms. She has also had issues with her thyroid and is started thyroid replacement therapy. She also is having issues with her menses and menopause which is currently being controlled and managed by her gynecologist.  She is taking her vitamins and supplements as directed. She is on a strict diet as prescribed by her nutritionist.  She is also working with a . 10/12/2020  Patient returns to the office for routine follow-up. She continues to have severe reflux. She has tried Protonix in the past but feels that she gets a facial rash once she takes it.   This has resulted in her avoiding use of the Protonix. She also complains of some mild abdominal pain. She denies any fevers or chills. She denies any hospitalizations or surgeries. 10/29/2020  This visit was completed virtually using doxy. me after informed consent was obtained from the patient. Patient is doing well. She continues to have severe reflux and epigastric and left upper quadrant abdominal pain after eating. She had some improvement with Carafate but not as much as she was hoping. Review of Systems  Review of Systems   Constitutional: Negative for chills and fever. HENT: Negative for ear pain, hearing loss, nosebleeds, sinus pain and tinnitus. Eyes: Negative for blurred vision and pain. Respiratory: Negative for cough, hemoptysis, shortness of breath and wheezing. Cardiovascular: Negative for chest pain, palpitations and leg swelling. Gastrointestinal: Negative for abdominal pain, blood in stool, constipation, diarrhea, heartburn, melena, nausea and vomiting. Genitourinary: Negative for dysuria, frequency and hematuria. Musculoskeletal: Negative for back pain, joint pain and myalgias. Skin: Negative for itching and rash. Neurological: Negative for dizziness, tingling, tremors, loss of consciousness and headaches. Endo/Heme/Allergies: Does not bruise/bleed easily. Psychiatric/Behavioral: Negative for depression and substance abuse. The patient is not nervous/anxious. Physical Exam  There were no vitals taken for this visit. Assessment    Problem List Items Addressed This Visit        Digestive    Gastroesophageal reflux disease without esophagitis - Primary    Relevant Medications    sucralfate (CARAFATE) 100 mg/mL suspension       Other    Status post bariatric surgery          Plan  53-year-old female with reflux, status post sleeve gastrectomy. #1. Check HIDA scan with stimulation to rule biliary etiology  2.   Return to the office in 2 to 3 weeks for follow-up

## 2020-11-06 ENCOUNTER — HOSPITAL ENCOUNTER (OUTPATIENT)
Dept: NUCLEAR MEDICINE | Age: 50
Discharge: HOME | End: 2020-11-06
Attending: SURGERY
Payer: COMMERCIAL

## 2020-11-06 DIAGNOSIS — K21.9 GASTROESOPHAGEAL REFLUX DISEASE WITHOUT ESOPHAGITIS: ICD-10-CM

## 2020-11-06 PROCEDURE — 78227 HEPATOBIL SYST IMAGE W/DRUG: CPT

## 2020-11-06 PROCEDURE — 74011250636 HC RX REV CODE- 250/636

## 2020-11-06 RX ADMIN — SINCALIDE 2 MCG: 5 INJECTION, POWDER, LYOPHILIZED, FOR SOLUTION INTRAVENOUS at 09:40

## 2021-02-09 ENCOUNTER — OFFICE VISIT (OUTPATIENT)
Dept: OBGYN CLINIC | Age: 51
End: 2021-02-09
Payer: COMMERCIAL

## 2021-02-09 VITALS
BODY MASS INDEX: 40.78 KG/M2 | SYSTOLIC BLOOD PRESSURE: 124 MMHG | HEIGHT: 61 IN | DIASTOLIC BLOOD PRESSURE: 72 MMHG | WEIGHT: 216 LBS

## 2021-02-09 DIAGNOSIS — N93.9 ABNORMAL UTERINE BLEEDING: Primary | ICD-10-CM

## 2021-02-09 PROCEDURE — 99214 OFFICE O/P EST MOD 30 MIN: CPT | Performed by: OBSTETRICS & GYNECOLOGY

## 2021-02-19 ENCOUNTER — OFFICE VISIT (OUTPATIENT)
Dept: OBGYN CLINIC | Age: 51
End: 2021-02-19
Payer: COMMERCIAL

## 2021-02-19 VITALS
TEMPERATURE: 96.7 F | BODY MASS INDEX: 40.78 KG/M2 | HEIGHT: 61 IN | WEIGHT: 216 LBS | SYSTOLIC BLOOD PRESSURE: 132 MMHG | DIASTOLIC BLOOD PRESSURE: 80 MMHG

## 2021-02-19 DIAGNOSIS — N93.9 ABNORMAL UTERINE BLEEDING: Primary | ICD-10-CM

## 2021-02-19 PROCEDURE — 58100 BIOPSY OF UTERUS LINING: CPT | Performed by: OBSTETRICS & GYNECOLOGY

## 2021-02-19 PROCEDURE — 99214 OFFICE O/P EST MOD 30 MIN: CPT | Performed by: OBSTETRICS & GYNECOLOGY

## 2021-02-22 NOTE — PROGRESS NOTES
Dank Varela is a 52 y.o. female, , No LMP recorded. (Menstrual status: Menopause). , who presents today for the following:  Chief Complaint   Patient presents with    Endometrial Biopsy        No Known Allergies    Current Outpatient Medications   Medication Sig    sucralfate (CARAFATE) 100 mg/mL suspension Take  by mouth four (4) times daily.  amoxicillin (AMOXIL) 500 mg capsule Take 500 mg by mouth.  calcium carbonate/vitamin D3 (CALCIUM 500 + D PO) Take  by mouth.  docusate sodium (Colace) 100 mg capsule Take 100 mg by mouth two (2) times a day.  cyclobenzaprine (FLEXERIL) 10 mg tablet Take  by mouth three (3) times daily as needed for Muscle Spasm(s).  fluconazole (DIFLUCAN) 150 mg tablet Take 150 mg by mouth daily. FDA advises cautious prescribing of oral fluconazole in pregnancy.  ketorolac (TORADOL) 10 mg tablet Take  by mouth.  meloxicam (MOBIC) 7.5 mg tablet Take  by mouth daily.  ondansetron hcl (ZOFRAN) 8 mg tablet Take 8 mg by mouth every eight (8) hours as needed for Nausea or Vomiting.  oseltamivir (TAMIFLU) 75 mg capsule Take  by mouth.  traMADoL (ULTRAM) 50 mg tablet Take 50 mg by mouth every six (6) hours as needed for Pain.  ursodioL (ACTIGALL) 300 mg capsule Take 300 mg by mouth two (2) times a day.  albuterol (Ventolin HFA) 90 mcg/actuation inhaler Take  by inhalation.  norethindrone-ethinyl estradiol (Junel FE , ,) 1 mg-20 mcg (21)/75 mg (7) tab Take  by mouth.  norethindrone-ethinyl estradiol (Loestrin , 21,) 1-20 mg-mcg tablet Take 1 Tab by mouth daily for 336 days.  levothyroxine (SYNTHROID) 50 mcg tablet Take 1 Tab by mouth Daily (before breakfast). Indications: a condition with low thyroid hormone levels    polyethylene glycol (MIRALAX) 17 gram packet Take 1 packet (17 Gm) per label directions 2 x day until BMs normalize then cut back dosing as needed to maintain at least every other day BMs. Stay well hydrated.     multivitamin (ONE A DAY) tablet Take 1 Tab by mouth daily.  omega 3-dha-epa-fish oil 100-160-1,000 mg cap Take  by mouth.  Biotin 2,500 mcg cap Take  by mouth. No current facility-administered medications for this visit. Past Medical History:   Diagnosis Date    GERD (gastroesophageal reflux disease)     Hiatal hernia     Hypercholesterolemia     Hypertension     Thyroid disease        Past Surgical History:   Procedure Laterality Date    HX CARPAL TUNNEL RELEASE      HX  SECTION      HX GI      gastric sleeves    IN LAP,TUBAL CAUTERY         Family History   Problem Relation Age of Onset    Heart Disease Father        Social History     Socioeconomic History    Marital status:      Spouse name: Not on file    Number of children: Not on file    Years of education: Not on file    Highest education level: Not on file   Occupational History    Not on file   Social Needs    Financial resource strain: Not on file    Food insecurity     Worry: Not on file     Inability: Not on file    Transportation needs     Medical: Not on file     Non-medical: Not on file   Tobacco Use    Smoking status: Never Smoker    Smokeless tobacco: Never Used   Substance and Sexual Activity    Alcohol use:  Yes    Drug use: Never    Sexual activity: Yes     Partners: Male     Birth control/protection: Surgical   Lifestyle    Physical activity     Days per week: Not on file     Minutes per session: Not on file    Stress: Not on file   Relationships    Social connections     Talks on phone: Not on file     Gets together: Not on file     Attends Jainism service: Not on file     Active member of club or organization: Not on file     Attends meetings of clubs or organizations: Not on file     Relationship status: Not on file    Intimate partner violence     Fear of current or ex partner: Not on file     Emotionally abused: Not on file     Physically abused: Not on file     Forced sexual activity: Not on file   Other Topics Concern    Not on file   Social History Narrative    Not on file         HPI  Presents for endometrial biopsy secondary to AUB    Review of Systems   Constitutional: Negative. Respiratory: Negative. Cardiovascular: Negative. Gastrointestinal: Negative. Genitourinary: Negative. Musculoskeletal: Negative. Skin: Negative. Neurological: Negative. Endo/Heme/Allergies: Negative. Psychiatric/Behavioral: Negative. All other systems reviewed and are negative. /80 (BP 1 Location: Right arm, BP Patient Position: Sitting)   Temp (!) 96.7 °F (35.9 °C)   Ht 5' 1\" (1.549 m)   Wt 216 lb (98 kg)   BMI 40.81 kg/m²    OBGyn Exam   PE:  Constitutional: General Appearance: healthy-appearing, well-nourished, well-developed, and well groomed. Psychiatric: Orientation: to time, place, and person. Mood and Affect: normal mood and affect and appropriate and active and alert. Abdomen: Auscultation/Inspection/Palpation: tenderness and mass and non-distended. Hernia: none palpated. Female Genitalia: Vulva: no masses, atrophy, or lesions. Bladder/Urethra: no urethral discharge or mass and normal meatus and bladder non distended. Vagina no tenderness, erythema, cystocele, rectocele, abnormal vaginal discharge, or vesicle(s) or ulcers. Cervix: no discharge or cervical motion tenderness and grossly normal.     Uterus: mobile, non-tender, and no uterine prolapse. Adnexa/Parametria: no adnexal tenderness. Endometrial Biopsy:     Risks, benefits and indications for endometrial biopsy procedure fully reviewed. Patient questions answered. Informed consent obtained. Speculum placed into vagina. Cervix was prepped with Betadine x 3. Tenaculum applied to anterior lip of cervix. Endometrial pipelle introduced. Suction initiated. A total of 4 passes performed. Endometrial tissue obtained.  Tenaculum site hemostatic after application of Monsel's solution. Patient tolerated procedure well. 1. Abnormal uterine bleeding    - BIOPSY OF UTERUS LINING  - SURGICAL PATHOLOGY        Follow-up and Dispositions    · Return in about 2 weeks (around 3/5/2021) for gyn.

## 2021-02-22 NOTE — PROGRESS NOTES
Dina Woodruff is a 52 y.o. female, , No LMP recorded. (Menstrual status: Menopause). , who presents today for the following:  Chief Complaint   Patient presents with    Menstrual Problem     Pt c/o bleeding since 23        No Known Allergies    Current Outpatient Medications   Medication Sig    sucralfate (CARAFATE) 100 mg/mL suspension Take  by mouth four (4) times daily.  calcium carbonate/vitamin D3 (CALCIUM 500 + D PO) Take  by mouth.  docusate sodium (Colace) 100 mg capsule Take 100 mg by mouth two (2) times a day.  norethindrone-ethinyl estradiol (Junel FE , ,) 1 mg-20 mcg (21)/75 mg (7) tab Take  by mouth.  levothyroxine (SYNTHROID) 50 mcg tablet Take 1 Tab by mouth Daily (before breakfast). Indications: a condition with low thyroid hormone levels    multivitamin (ONE A DAY) tablet Take 1 Tab by mouth daily.  omega 3-dha-epa-fish oil 100-160-1,000 mg cap Take  by mouth.  Biotin 2,500 mcg cap Take  by mouth.  amoxicillin (AMOXIL) 500 mg capsule Take 500 mg by mouth.  cyclobenzaprine (FLEXERIL) 10 mg tablet Take  by mouth three (3) times daily as needed for Muscle Spasm(s).  fluconazole (DIFLUCAN) 150 mg tablet Take 150 mg by mouth daily. FDA advises cautious prescribing of oral fluconazole in pregnancy.  ketorolac (TORADOL) 10 mg tablet Take  by mouth.  meloxicam (MOBIC) 7.5 mg tablet Take  by mouth daily.  ondansetron hcl (ZOFRAN) 8 mg tablet Take 8 mg by mouth every eight (8) hours as needed for Nausea or Vomiting.  oseltamivir (TAMIFLU) 75 mg capsule Take  by mouth.  traMADoL (ULTRAM) 50 mg tablet Take 50 mg by mouth every six (6) hours as needed for Pain.  ursodioL (ACTIGALL) 300 mg capsule Take 300 mg by mouth two (2) times a day.  albuterol (Ventolin HFA) 90 mcg/actuation inhaler Take  by inhalation.  norethindrone-ethinyl estradiol (Loestrin , ,) 1-20 mg-mcg tablet Take 1 Tab by mouth daily for 336 days.     polyethylene glycol (MIRALAX) 17 gram packet Take 1 packet (17 Gm) per label directions 2 x day until BMs normalize then cut back dosing as needed to maintain at least every other day BMs. Stay well hydrated.     No current facility-administered medications for this visit.        Past Medical History:   Diagnosis Date   • GERD (gastroesophageal reflux disease)    • Hiatal hernia    • Hypercholesterolemia    • Hypertension    • Thyroid disease        Past Surgical History:   Procedure Laterality Date   • HX CARPAL TUNNEL RELEASE     • HX  SECTION     • HX GI      gastric sleeves   • [place] LAP,TUBAL CAUTERY         Family History   Problem Relation Age of Onset   • Heart Disease Father        Social History     Socioeconomic History   • Marital status:      Spouse name: Not on file   • Number of children: Not on file   • Years of education: Not on file   • Highest education level: Not on file   Occupational History   • Not on file   Social Needs   • Financial resource strain: Not on file   • Food insecurity     Worry: Not on file     Inability: Not on file   • Transportation needs     Medical: Not on file     Non-medical: Not on file   Tobacco Use   • Smoking status: Never Smoker   • Smokeless tobacco: Never Used   Substance and Sexual Activity   • Alcohol use: Yes   • Drug use: Never   • Sexual activity: Yes     Partners: Male     Birth control/protection: Surgical   Lifestyle   • Physical activity     Days per week: Not on file     Minutes per session: Not on file   • Stress: Not on file   Relationships   • Social connections     Talks on phone: Not on file     Gets together: Not on file     Attends [employer] service: Not on file     Active member of club or organization: Not on file     Attends meetings of clubs or organizations: Not on file     Relationship status: Not on file   • Intimate partner violence     Fear of current or ex partner: Not on file     Emotionally abused: Not on file     Physically abused: Not  on file     Forced sexual activity: Not on file   Other Topics Concern    Not on file   Social History Narrative    Not on file         HPI  Abnormal Bleeding   Reported by patient. Onset/Timing:bleeding since 1/12/21  Duration: daily  Quality: from light to heavy  Severity: changing pad/tampon often when heavy flow; nterferes with daily activities; requires getting up at night; bleeding through onto clothes/sheets      Associated Symptoms: no abdominal pain; no dyspareunia; no fatigue; no dizziness; no anemia/iron supplements; no shortness of breath; no chest pain; no palpations; no bloating; no change in bowel function; no urinary symptoms; no PMS; no vaginal discharge; no vaginal itching/irritation; dysmenorrhea; pelvic pain     Review of Systems   Constitutional: Negative. Respiratory: Negative. Cardiovascular: Negative. Gastrointestinal: Negative. Genitourinary: Negative. Musculoskeletal: Negative. Skin: Negative. Neurological: Negative. Endo/Heme/Allergies: Negative. Psychiatric/Behavioral: Negative. All other systems reviewed and are negative. /72 (BP 1 Location: Left upper arm, BP Patient Position: Sitting)   Ht 5' 1\" (1.549 m)   Wt 216 lb (98 kg)   BMI 40.81 kg/m²    OBGyn Exam   PE:  Constitutional: General Appearance: healthy-appearing, well-nourished, well-developed, and well groomed. Psychiatric: Orientation: to time, place, and person. Mood and Affect: normal mood and affect and appropriate and active and alert. Abdomen: Auscultation/Inspection/Palpation: tenderness and mass and non-distended. Hernia: none palpated. Female Genitalia: Vulva: no masses, atrophy, or lesions. Bladder/Urethra: no urethral discharge or mass and normal meatus and bladder non distended. Vagina no tenderness, erythema, cystocele, rectocele, abnormal vaginal discharge, or vesicle(s) or ulcers.      Cervix: no discharge or cervical motion tenderness and grossly normal.     Uterus: mobile, non-tender, and no uterine prolapse. Adnexa/Parametria: no adnexal tenderness. PE:  Constitutional: General Appearance: healthy-appearing, well-nourished, well-developed, and well groomed. Psychiatric: Orientation: to time, place, and person. Mood and Affect: normal mood and affect and appropriate and active and alert. Abdomen: Auscultation/Inspection/Palpation: tenderness and mass and non-distended. Hernia: none palpated. Female Genitalia: Vulva: no masses, atrophy, or lesions. Bladder/Urethra: no urethral discharge or mass and normal meatus and bladder non distended. Vagina no tenderness, erythema, cystocele, rectocele, abnormal vaginal discharge, or vesicle(s) or ulcers. Blood in vault    Cervix: no discharge or cervical motion tenderness and grossly normal.     Uterus: mobile, non-tender, and no uterine prolapse. Adnexa/Parametria: no adnexal tenderness. 1. Abnormal uterine bleeding          Follow-up and Dispositions    · Return in about 1 week (around 2/16/2021) for gyn emb.

## 2021-02-25 ENCOUNTER — TELEPHONE (OUTPATIENT)
Dept: OBGYN CLINIC | Age: 51
End: 2021-02-25

## 2021-02-25 NOTE — TELEPHONE ENCOUNTER
Returned patient's call regarding her EMB results. Left a message on patient's voicemail that her results are not available yet.

## 2021-03-01 LAB
CPT CODES, 490044: NORMAL
CPT DISCLAIMER: NORMAL
CYTOLOGY SPEC DOC CYTO: NORMAL
DIAGNOSIS SYNOPSIS:: NORMAL
DX ICD CODE: NORMAL
PATH REPORT.GROSS SPEC: NORMAL
PATH REPORT.RELEVANT HX SPEC: NORMAL
PATHOLOGIST NAME: NORMAL
PDF IMAGE, 807507: NORMAL
SPECIMEN SOURCE: NORMAL

## 2021-03-08 ENCOUNTER — OFFICE VISIT (OUTPATIENT)
Dept: OBGYN CLINIC | Age: 51
End: 2021-03-08
Payer: COMMERCIAL

## 2021-03-08 VITALS
BODY MASS INDEX: 40.4 KG/M2 | WEIGHT: 214 LBS | HEIGHT: 61 IN | OXYGEN SATURATION: 99 % | DIASTOLIC BLOOD PRESSURE: 84 MMHG | HEART RATE: 99 BPM | SYSTOLIC BLOOD PRESSURE: 141 MMHG | TEMPERATURE: 96.7 F

## 2021-03-08 DIAGNOSIS — N93.9 ABNORMAL UTERINE BLEEDING: Primary | ICD-10-CM

## 2021-03-08 PROCEDURE — 99214 OFFICE O/P EST MOD 30 MIN: CPT | Performed by: OBSTETRICS & GYNECOLOGY

## 2021-03-08 RX ORDER — IBUPROFEN 800 MG/1
800 TABLET ORAL
Qty: 60 TAB | Refills: 0 | Status: SHIPPED | OUTPATIENT
Start: 2021-03-08 | End: 2022-04-16

## 2021-03-15 NOTE — PROGRESS NOTES
Myke Mccann is a 52 y.o. female, , No LMP recorded. (Menstrual status: Menopause). , who presents today for the following:  Chief Complaint   Patient presents with    Follow-up        No Known Allergies    Current Outpatient Medications   Medication Sig    ibuprofen (MOTRIN) 800 mg tablet Take 1 Tab by mouth every six (6) hours as needed for Pain.  calcium carbonate/vitamin D3 (CALCIUM 500 + D PO) Take  by mouth.  norethindrone-ethinyl estradiol (Junel FE , ,) 1 mg-20 mcg (21)/75 mg (7) tab Take  by mouth.  multivitamin (ONE A DAY) tablet Take 1 Tab by mouth daily.  Biotin 2,500 mcg cap Take  by mouth.  levothyroxine (SYNTHROID) 50 mcg tablet TAKE 1 TAB BY MOUTH DAILY (BEFORE BREAKFAST)    sucralfate (CARAFATE) 100 mg/mL suspension Take  by mouth four (4) times daily.  docusate sodium (Colace) 100 mg capsule Take 100 mg by mouth two (2) times a day.  cyclobenzaprine (FLEXERIL) 10 mg tablet Take  by mouth three (3) times daily as needed for Muscle Spasm(s).  fluconazole (DIFLUCAN) 150 mg tablet Take 150 mg by mouth daily. FDA advises cautious prescribing of oral fluconazole in pregnancy.  ketorolac (TORADOL) 10 mg tablet Take  by mouth.  meloxicam (MOBIC) 7.5 mg tablet Take  by mouth daily.  ondansetron hcl (ZOFRAN) 8 mg tablet Take 8 mg by mouth every eight (8) hours as needed for Nausea or Vomiting.  oseltamivir (TAMIFLU) 75 mg capsule Take  by mouth.  traMADoL (ULTRAM) 50 mg tablet Take 50 mg by mouth every six (6) hours as needed for Pain.  ursodioL (ACTIGALL) 300 mg capsule Take 300 mg by mouth two (2) times a day.  albuterol (Ventolin HFA) 90 mcg/actuation inhaler Take  by inhalation.  polyethylene glycol (MIRALAX) 17 gram packet Take 1 packet (17 Gm) per label directions 2 x day until BMs normalize then cut back dosing as needed to maintain at least every other day BMs. Stay well hydrated.     omega 3-dha-epa-fish oil 100-160-1,000 mg cap Take by mouth. No current facility-administered medications for this visit. Past Medical History:   Diagnosis Date    GERD (gastroesophageal reflux disease)     Hiatal hernia     Hypercholesterolemia     Hypertension     Thyroid disease        Past Surgical History:   Procedure Laterality Date    HX CARPAL TUNNEL RELEASE      HX  SECTION      HX GI      gastric sleeves    [place] LAP,TUBAL CAUTERY         Family History   Problem Relation Age of Onset    Heart Disease Father        Social History     Socioeconomic History    Marital status:      Spouse name: Not on file    Number of children: Not on file    Years of education: Not on file    Highest education level: Not on file   Occupational History    Not on file   Social Needs    Financial resource strain: Not on file    Food insecurity     Worry: Not on file     Inability: Not on file    Transportation needs     Medical: Not on file     Non-medical: Not on file   Tobacco Use    Smoking status: Never Smoker    Smokeless tobacco: Never Used   Substance and Sexual Activity    Alcohol use:  Yes    Drug use: Never    Sexual activity: Yes     Partners: Male     Birth control/protection: Surgical   Lifestyle    Physical activity     Days per week: Not on file     Minutes per session: Not on file    Stress: Not on file   Relationships    Social connections     Talks on phone: Not on file     Gets together: Not on file     Attends Taoism service: Not on file     Active member of club or organization: Not on file     Attends meetings of clubs or organizations: Not on file     Relationship status: Not on file    Intimate partner violence     Fear of current or ex partner: Not on file     Emotionally abused: Not on file     Physically abused: Not on file     Forced sexual activity: Not on file   Other Topics Concern    Not on file   Social History Narrative    Not on file         HPI   Patient presents for follow up  S/p EMB for evaluation of AUB  Patient reports she continues to experience bleeding  No pain  Pathology reveal  [facility]. NO EVIDENCE OF HYPERPLASIA OR ENDOMETRIAL   INTRAEPITHELIAL NEOPLASIA  Results discussed and reviewed with patient  US of pelvis is pending  Managament options discussed pending US results    Review of Systems   Constitutional: Negative. Respiratory: Negative. Cardiovascular: Negative. Gastrointestinal: Negative. Genitourinary: Negative. Musculoskeletal: Negative. Skin: Negative. Neurological: Negative. Endo/Heme/Allergies: Negative. Psychiatric/Behavioral: Negative. All other systems reviewed and are negative. BP (!) 141/84 (BP 1 Location: Right arm)   Pulse 99   Temp (!) 96.7 °F (35.9 °C)   Ht 5' 1\" (1.549 m)   Wt 214 lb (97.1 kg)   SpO2 99%   BMI 40.43 kg/m²    OBGyn Exam   PE:  Constitutional: General Appearance: healthy-appearing, well-nourished, well-developed, and well groomed. Psychiatric: Orientation: to time, place, and person. Mood and Affect: normal mood and affect and appropriate and active and alert. Abdomen: Auscultation/Inspection/Palpation: tenderness and mass and non-distended. Hernia: none palpated. Female Genitalia: Vulva: no masses, atrophy, or lesions. Bladder/Urethra: no urethral discharge or mass and normal meatus and bladder non distended. Vagina no tenderness, erythema, cystocele, rectocele, abnormal vaginal discharge, or vesicle(s) or ulcers. Scant blood    Cervix: no discharge or cervical motion tenderness and grossly normal.     Uterus: mobile, non-tender, and no uterine prolapse. Adnexa/Parametria: no adnexal tenderness.      1. Abnormal uterine bleeding    - US PELV NON OBS; Future

## 2021-03-22 ENCOUNTER — OFFICE VISIT (OUTPATIENT)
Dept: OBGYN CLINIC | Age: 51
End: 2021-03-22
Payer: COMMERCIAL

## 2021-03-22 VITALS
HEIGHT: 61 IN | OXYGEN SATURATION: 99 % | WEIGHT: 213 LBS | HEART RATE: 85 BPM | RESPIRATION RATE: 16 BRPM | TEMPERATURE: 97.5 F | BODY MASS INDEX: 40.22 KG/M2

## 2021-03-22 DIAGNOSIS — N93.9 ABNORMAL UTERINE BLEEDING: Primary | ICD-10-CM

## 2021-03-22 PROCEDURE — 99213 OFFICE O/P EST LOW 20 MIN: CPT | Performed by: OBSTETRICS & GYNECOLOGY

## 2021-03-24 ENCOUNTER — TELEPHONE (OUTPATIENT)
Dept: OBGYN CLINIC | Age: 51
End: 2021-03-24

## 2021-03-24 DIAGNOSIS — N93.9 ABNORMAL UTERINE BLEEDING: Primary | ICD-10-CM

## 2021-03-24 NOTE — PATIENT INSTRUCTIONS
Operative Hysteroscopy: Before Your Procedure What is an operative hysteroscopy? An operative hysteroscopy is a procedure to find and treat problems with your uterus. It may be done to remove growths from the uterus. It may also be used to treat fertility problems or abnormal bleeding. The doctor will guide a lighted tube through the cervix and into the uterus. This tube is called a hysteroscope, or scope. The doctor will fill your uterus with liquid. This makes it easier to see the inside of your uterus with the scope. Then the doctor will put tools through the scope to do the procedure. Most of the liquid that the doctor puts in will flow out when the scope is removed. You will most likely go home the same day. Many women are able to go back to work the next day. But it depends on what was done and the type of work you do. Follow-up care is a key part of your treatment and safety. Be sure to make and go to all appointments, and call your doctor if you are having problems. It's also a good idea to know your test results and keep a list of the medicines you take. What happens before the procedure? Preparing for the procedure 
  · Understand exactly what procedure is planned, along with the risks, benefits, and other options. · Tell your doctors ALL the medicines, vitamins, supplements, and herbal remedies you take. Some of these can increase the risk of bleeding or interact with anesthesia.  
  · If you take aspirin or some other blood thinner, ask your doctor if you should stop taking it before your procedure. Make sure that you understand exactly what your doctor wants you to do. These medicines increase the risk of bleeding.  
  · Your doctor will tell you which medicines to take or stop before your procedure. You may need to stop taking certain medicines a week or more before the procedure. So talk to your doctor as soon as you can.  
  · If you have an advance directive, let your doctor know. It may include a living will and a durable power of  for health care. Bring a copy to the hospital. If you don't have one, you may want to prepare one. It lets your doctor and loved ones know your health care wishes. Doctors advise that everyone prepare these papers before any type of surgery or procedure. What happens on the day of the procedure? · Follow the instructions exactly about when to stop eating and drinking. If you don't, your procedure may be canceled. If your doctor has instructed you to take your medicines on the day of the procedure, take them with only a sip of water.  
  · Take a bath or shower before you come in for your procedure. Do not apply lotions, perfumes, deodorants, or nail polish.  
  · Take off all jewelry and piercings. And take out contact lenses, if you wear them. At the hospital or surgery center · Bring a picture ID.  
  · You will be kept comfortable and safe by your anesthesia provider. The anesthesia may make you sleep. Or it may just numb the area being worked on.  
  · The procedure will probably take less than an hour. When should you call your doctor? · You have questions or concerns.  
  · You don't understand how to prepare for your procedure.  
  · You become ill before the procedure (such as fever, flu, or a cold).  
  · You need to reschedule or have changed your mind about having the procedure. Where can you learn more? Go to http://www.gray.com/ Enter G996 in the search box to learn more about \"Operative Hysteroscopy: Before Your Procedure. \" Current as of: November 8, 2019               Content Version: 12.6 © 3934-4602 [employer]. Care instructions adapted under license by [employer] (which disclaims liability or warranty for this information).  If you have questions about a medical condition or this instruction, always ask your healthcare professional. [address] any warranty or liability for your use of this information.

## 2021-03-24 NOTE — PROGRESS NOTES
Jyotsna Hassan is a 52 y.o. female, , No LMP recorded. (Menstrual status: Menopause). , who presents today for the following:  Chief Complaint   Patient presents with    Results        No Known Allergies    Current Outpatient Medications   Medication Sig    levothyroxine (SYNTHROID) 50 mcg tablet TAKE 1 TAB BY MOUTH DAILY (BEFORE BREAKFAST)    ibuprofen (MOTRIN) 800 mg tablet Take 1 Tab by mouth every six (6) hours as needed for Pain.  sucralfate (CARAFATE) 100 mg/mL suspension Take  by mouth four (4) times daily.  calcium carbonate/vitamin D3 (CALCIUM 500 + D PO) Take  by mouth.  docusate sodium (Colace) 100 mg capsule Take 100 mg by mouth two (2) times a day.  cyclobenzaprine (FLEXERIL) 10 mg tablet Take  by mouth three (3) times daily as needed for Muscle Spasm(s).  fluconazole (DIFLUCAN) 150 mg tablet Take 150 mg by mouth daily. FDA advises cautious prescribing of oral fluconazole in pregnancy.  ketorolac (TORADOL) 10 mg tablet Take  by mouth.  meloxicam (MOBIC) 7.5 mg tablet Take  by mouth daily.  ondansetron hcl (ZOFRAN) 8 mg tablet Take 8 mg by mouth every eight (8) hours as needed for Nausea or Vomiting.  oseltamivir (TAMIFLU) 75 mg capsule Take  by mouth.  traMADoL (ULTRAM) 50 mg tablet Take 50 mg by mouth every six (6) hours as needed for Pain.  ursodioL (ACTIGALL) 300 mg capsule Take 300 mg by mouth two (2) times a day.  albuterol (Ventolin HFA) 90 mcg/actuation inhaler Take  by inhalation.  norethindrone-ethinyl estradiol (Junel FE , ,) 1 mg-20 mcg (21)/75 mg (7) tab Take  by mouth.  polyethylene glycol (MIRALAX) 17 gram packet Take 1 packet (17 Gm) per label directions 2 x day until BMs normalize then cut back dosing as needed to maintain at least every other day BMs. Stay well hydrated.  multivitamin (ONE A DAY) tablet Take 1 Tab by mouth daily.  omega 3-dha-epa-fish oil 100-160-1,000 mg cap Take  by mouth.     Biotin 2,500 mcg cap Take by mouth. No current facility-administered medications for this visit. Past Medical History:   Diagnosis Date    GERD (gastroesophageal reflux disease)     Hiatal hernia     Hypercholesterolemia     Hypertension     Thyroid disease        Past Surgical History:   Procedure Laterality Date    HX CARPAL TUNNEL RELEASE      HX  SECTION      HX GI      gastric sleeves    [place] LAP,TUBAL CAUTERY         Family History   Problem Relation Age of Onset    Heart Disease Father        Social History     Socioeconomic History    Marital status:      Spouse name: Not on file    Number of children: Not on file    Years of education: Not on file    Highest education level: Not on file   Occupational History    Not on file   Social Needs    Financial resource strain: Not on file    Food insecurity     Worry: Not on file     Inability: Not on file    Transportation needs     Medical: Not on file     Non-medical: Not on file   Tobacco Use    Smoking status: Never Smoker    Smokeless tobacco: Never Used   Substance and Sexual Activity    Alcohol use:  Yes    Drug use: Never    Sexual activity: Yes     Partners: Male     Birth control/protection: Surgical   Lifestyle    Physical activity     Days per week: Not on file     Minutes per session: Not on file    Stress: Not on file   Relationships    Social connections     Talks on phone: Not on file     Gets together: Not on file     Attends [employer] service: Not on file     Active member of club or organization: Not on file     Attends meetings of clubs or organizations: Not on file     Relationship status: Not on file    Intimate partner violence     Fear of current or ex partner: Not on file     Emotionally abused: Not on file     Physically abused: Not on file     Forced sexual activity: Not on file   Other Topics Concern    Not on file   Social History Narrative    Not on file         HPI  Patient presents for follow up appt  Patient continues to experience AUB  Reports bleeding differs from day to day in flow but still present   Bright red to dark color , spotting to moderate flow  S/p in office EMB which revealed [address]. NO EVIDENCE OF HYPERPLASIA OR ENDOMETRIAL   INTRAEPITHELIAL NEOPLASIA. Ultrasound was ordered as patient continued to have AUB and EMB was normal  Ultrasound revealed and uterus measuring 8.2x3. 7x3.9 cm  Endometrial stripe measuring 7mm    Reviewed the ultrasound findings with patient. Discussed with patient need for further endometrial sampling as stripe is thickened-   Reviewed the limitations associated with office EMB- although diagnostic and part of standard of care- possible missed diagnosis/ pathology can still occur  Thickening could be related to but not limited to  polyp, hyperplasia, or carcinoma  Patient states comprehension . Stating \" I just want to feel right and this needs to stop. \"        Review of Systems   Constitutional: Negative. Respiratory: Negative. Cardiovascular: Negative. Gastrointestinal: Negative. Genitourinary: Negative. Musculoskeletal: Negative. Skin: Negative. Neurological: Negative. Endo/Heme/Allergies: Negative. Psychiatric/Behavioral: Negative. All other systems reviewed and are negative. Pulse 85   Temp 97.5 °F (36.4 °C)   Resp 16   Ht 5' 1\" (1.549 m)   Wt 213 lb (96.6 kg)   SpO2 99%   BMI 40.25 kg/m²    OBGyn Exam   PE:  Constitutional: General Appearance: healthy-appearing, well-nourished, well-developed, and well groomed. Psychiatric: Orientation: to time, place, and person. Mood and Affect: normal mood and affect and appropriate and active and alert.        1. Abnormal uterine bleeding  Plan to schedule for operative Hysteroscopy with [facility]

## 2021-09-10 ENCOUNTER — APPOINTMENT (OUTPATIENT)
Dept: GENERAL RADIOLOGY | Age: 51
End: 2021-09-10
Attending: EMERGENCY MEDICINE
Payer: COMMERCIAL

## 2021-09-10 ENCOUNTER — HOSPITAL ENCOUNTER (EMERGENCY)
Age: 51
Discharge: HOME | End: 2021-09-10
Attending: EMERGENCY MEDICINE | Admitting: EMERGENCY MEDICINE
Payer: COMMERCIAL

## 2021-09-10 VITALS
RESPIRATION RATE: 16 BRPM | TEMPERATURE: 98.1 F | BODY MASS INDEX: 38.71 KG/M2 | OXYGEN SATURATION: 100 % | DIASTOLIC BLOOD PRESSURE: 90 MMHG | HEART RATE: 62 BPM | HEIGHT: 61 IN | WEIGHT: 205 LBS | SYSTOLIC BLOOD PRESSURE: 122 MMHG

## 2021-09-10 DIAGNOSIS — Z82.49 FAMILY HISTORY OF CORONARY ARTERY DISEASE: ICD-10-CM

## 2021-09-10 DIAGNOSIS — R07.9 ACUTE CHEST PAIN: Primary | ICD-10-CM

## 2021-09-10 LAB
ALBUMIN SERPL-MCNC: 3.6 G/DL (ref 3.5–5)
ALBUMIN/GLOB SERPL: 0.8 {RATIO} (ref 1.1–2.2)
ALP SERPL-CCNC: 101 U/L (ref 45–117)
ALT SERPL-CCNC: 53 U/L (ref 12–78)
ANION GAP SERPL CALC-SCNC: 6 MMOL/L (ref 5–15)
AST SERPL-CCNC: 35 U/L (ref 15–37)
ATRIAL RATE: 59 BPM
ATRIAL RATE: 69 BPM
BASOPHILS # BLD: 0.1 K/UL (ref 0–0.1)
BASOPHILS NFR BLD: 2 % (ref 0–1)
BILIRUB SERPL-MCNC: 0.4 MG/DL (ref 0.2–1)
BUN SERPL-MCNC: 11 MG/DL (ref 6–20)
BUN/CREAT SERPL: 11 (ref 12–20)
CALCIUM SERPL-MCNC: 9.2 MG/DL (ref 8.5–10.1)
CALCULATED P AXIS, ECG09: 45 DEGREES
CALCULATED P AXIS, ECG09: 49 DEGREES
CALCULATED R AXIS, ECG10: 23 DEGREES
CALCULATED R AXIS, ECG10: 31 DEGREES
CALCULATED T AXIS, ECG11: 21 DEGREES
CALCULATED T AXIS, ECG11: 25 DEGREES
CHLORIDE SERPL-SCNC: 103 MMOL/L (ref 97–108)
CO2 SERPL-SCNC: 31 MMOL/L (ref 21–32)
CREAT SERPL-MCNC: 0.96 MG/DL (ref 0.55–1.02)
DIAGNOSIS, 93000: NORMAL
DIAGNOSIS, 93000: NORMAL
DIFFERENTIAL METHOD BLD: ABNORMAL
EOSINOPHIL # BLD: 0.3 K/UL (ref 0–0.4)
EOSINOPHIL NFR BLD: 4 % (ref 0–7)
ERYTHROCYTE [DISTWIDTH] IN BLOOD BY AUTOMATED COUNT: 12.3 % (ref 11.5–14.5)
GLOBULIN SER CALC-MCNC: 4.5 G/DL (ref 2–4)
GLUCOSE SERPL-MCNC: 93 MG/DL (ref 65–100)
HCT VFR BLD AUTO: 44 % (ref 35–47)
HGB BLD-MCNC: 14.2 G/DL (ref 11.5–16)
IMM GRANULOCYTES # BLD AUTO: 0 K/UL (ref 0–0.04)
IMM GRANULOCYTES NFR BLD AUTO: 0 % (ref 0–0.5)
LYMPHOCYTES # BLD: 2.3 K/UL (ref 0.8–3.5)
LYMPHOCYTES NFR BLD: 34 % (ref 12–49)
MCH RBC QN AUTO: 29.6 PG (ref 26–34)
MCHC RBC AUTO-ENTMCNC: 32.3 G/DL (ref 30–36.5)
MCV RBC AUTO: 91.9 FL (ref 80–99)
MONOCYTES # BLD: 0.4 K/UL (ref 0–1)
MONOCYTES NFR BLD: 6 % (ref 5–13)
NEUTS SEG # BLD: 3.7 K/UL (ref 1.8–8)
NEUTS SEG NFR BLD: 54 % (ref 32–75)
NRBC # BLD: 0 K/UL (ref 0–0.01)
NRBC BLD-RTO: 0 PER 100 WBC
P-R INTERVAL, ECG05: 178 MS
P-R INTERVAL, ECG05: 180 MS
PLATELET # BLD AUTO: 233 K/UL (ref 150–400)
PMV BLD AUTO: 11 FL (ref 8.9–12.9)
POTASSIUM SERPL-SCNC: 4 MMOL/L (ref 3.5–5.1)
PROT SERPL-MCNC: 8.1 G/DL (ref 6.4–8.2)
Q-T INTERVAL, ECG07: 386 MS
Q-T INTERVAL, ECG07: 420 MS
QRS DURATION, ECG06: 82 MS
QRS DURATION, ECG06: 84 MS
QTC CALCULATION (BEZET), ECG08: 413 MS
QTC CALCULATION (BEZET), ECG08: 415 MS
RBC # BLD AUTO: 4.79 M/UL (ref 3.8–5.2)
SODIUM SERPL-SCNC: 140 MMOL/L (ref 136–145)
TROPONIN I SERPL-MCNC: <0.05 NG/ML
TROPONIN I SERPL-MCNC: <0.05 NG/ML
VENTRICULAR RATE, ECG03: 59 BPM
VENTRICULAR RATE, ECG03: 69 BPM
WBC # BLD AUTO: 6.9 K/UL (ref 3.6–11)

## 2021-09-10 PROCEDURE — 71045 X-RAY EXAM CHEST 1 VIEW: CPT

## 2021-09-10 PROCEDURE — 96374 THER/PROPH/DIAG INJ IV PUSH: CPT

## 2021-09-10 PROCEDURE — 99284 EMERGENCY DEPT VISIT MOD MDM: CPT

## 2021-09-10 PROCEDURE — 96375 TX/PRO/DX INJ NEW DRUG ADDON: CPT

## 2021-09-10 PROCEDURE — 36415 COLL VENOUS BLD VENIPUNCTURE: CPT

## 2021-09-10 PROCEDURE — 93005 ELECTROCARDIOGRAM TRACING: CPT

## 2021-09-10 PROCEDURE — 80053 COMPREHEN METABOLIC PANEL: CPT

## 2021-09-10 PROCEDURE — 74011250636 HC RX REV CODE- 250/636: Performed by: EMERGENCY MEDICINE

## 2021-09-10 PROCEDURE — 84484 ASSAY OF TROPONIN QUANT: CPT

## 2021-09-10 PROCEDURE — 85025 COMPLETE CBC W/AUTO DIFF WBC: CPT

## 2021-09-10 RX ORDER — KETOROLAC TROMETHAMINE 30 MG/ML
30 INJECTION, SOLUTION INTRAMUSCULAR; INTRAVENOUS
Status: COMPLETED | OUTPATIENT
Start: 2021-09-10 | End: 2021-09-10

## 2021-09-10 RX ORDER — ONDANSETRON 2 MG/ML
4 INJECTION INTRAMUSCULAR; INTRAVENOUS
Status: COMPLETED | OUTPATIENT
Start: 2021-09-10 | End: 2021-09-10

## 2021-09-10 RX ORDER — MORPHINE SULFATE 2 MG/ML
4 INJECTION, SOLUTION INTRAMUSCULAR; INTRAVENOUS
Status: COMPLETED | OUTPATIENT
Start: 2021-09-10 | End: 2021-09-10

## 2021-09-10 RX ADMIN — MORPHINE SULFATE 4 MG: 2 INJECTION, SOLUTION INTRAMUSCULAR; INTRAVENOUS at 13:04

## 2021-09-10 RX ADMIN — ONDANSETRON 4 MG: 2 INJECTION INTRAMUSCULAR; INTRAVENOUS at 13:03

## 2021-09-10 RX ADMIN — KETOROLAC TROMETHAMINE 30 MG: 30 INJECTION, SOLUTION INTRAMUSCULAR; INTRAVENOUS at 10:40

## 2021-09-10 NOTE — ED PROVIDER NOTES
EMERGENCY DEPARTMENT HISTORY AND PHYSICAL EXAM      Date: 9/10/2021  Patient Name: Chapo Marks    History of Presenting Illness     Chief Complaint   Patient presents with    Chest Pain     pain in left chest she thought she pulled something; one week; left arm numb for two weeks       History Provided By: Patient    HPI: Chapo Marks, 52 y.o. female presents to the ED with family history of heart disease in her father had an MI in his 62s, both parents with high blood pressure but he does not smoke, have high blood pressure, high cholesterol or family or personal history of PE, not on anticoagulation no ongoing birth control use, no recent surgeries here with chest discomfort at approximately 830 this morning when she was picking up a laundry basket. 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 When she went to bend down and  and lift a laundry basket she experienced pain in the left side of her chest.  She said when she moves her arm it hurts when she touches her chest it hurts. She has not noticed any breast lumps or nipple discharge. She denies any fever or cough. She has been vaccinated for COVID-19. She has not noticed any swelling in her legs or calf pain. Review the medical record the patient has hypercholesterolemia, hypertension and thyroid disease with the patient that she does not know about this and does not take medication for any of this. Patient is a non-smoker. There are no other complaints, changes, or physical findings at this time. PCP: Vesna Knight NP    No current facility-administered medications on file prior to encounter.      Current Outpatient Medications on File Prior to Encounter   Medication Sig Dispense Refill    levothyroxine (SYNTHROID) 50 mcg tablet TAKE 1 TAB BY MOUTH DAILY (BEFORE BREAKFAST) 90 Tab 1    ibuprofen (MOTRIN) 800 mg tablet Take 1 Tab by mouth every six (6) hours as needed for Pain. 60 Tab 0    sucralfate (CARAFATE) 100 mg/mL suspension Take  by mouth four (4) times daily.  calcium carbonate/vitamin D3 (CALCIUM 500 + D PO) Take  by mouth.  docusate sodium (Colace) 100 mg capsule Take 100 mg by mouth two (2) times a day.  cyclobenzaprine (FLEXERIL) 10 mg tablet Take  by mouth three (3) times daily as needed for Muscle Spasm(s).  fluconazole (DIFLUCAN) 150 mg tablet Take 150 mg by mouth daily. FDA advises cautious prescribing of oral fluconazole in pregnancy.  ketorolac (TORADOL) 10 mg tablet Take  by mouth.  meloxicam (MOBIC) 7.5 mg tablet Take  by mouth daily.  ondansetron hcl (ZOFRAN) 8 mg tablet Take 8 mg by mouth every eight (8) hours as needed for Nausea or Vomiting.  oseltamivir (TAMIFLU) 75 mg capsule Take  by mouth.  traMADoL (ULTRAM) 50 mg tablet Take 50 mg by mouth every six (6) hours as needed for Pain.  ursodioL (ACTIGALL) 300 mg capsule Take 300 mg by mouth two (2) times a day.  albuterol (Ventolin HFA) 90 mcg/actuation inhaler Take  by inhalation.  norethindrone-ethinyl estradiol (Junel FE 1/20, 28,) 1 mg-20 mcg (21)/75 mg (7) tab Take  by mouth.  polyethylene glycol (MIRALAX) 17 gram packet Take 1 packet (17 Gm) per label directions 2 x day until BMs normalize then cut back dosing as needed to maintain at least every other day BMs. Stay well hydrated. 60 Packet 5    multivitamin (ONE A DAY) tablet Take 1 Tab by mouth daily.  omega 3-dha-epa-fish oil 100-160-1,000 mg cap Take  by mouth.  Biotin 2,500 mcg cap Take  by mouth.          Past History     Past Medical History:  Past Medical History:   Diagnosis Date    GERD (gastroesophageal reflux disease)     Hiatal hernia     Hypercholesterolemia     Hypertension     Thyroid disease        Past Surgical History:  Past Surgical History:   Procedure Laterality Date    HX CARPAL TUNNEL RELEASE  HX  SECTION      HX GI      gastric sleeves    UT LAP,TUBAL CAUTERY         Family History:  Family History   Problem Relation Age of Onset    Heart Disease Father        Social History:  Social History     Tobacco Use    Smoking status: Never Smoker    Smokeless tobacco: Never Used   Substance Use Topics    Alcohol use: Yes    Drug use: Never       Allergies:  No Known Allergies      Review of Systems   Review of Systems   Constitutional: Negative. HENT: Negative. Respiratory: Positive for shortness of breath. Cardiovascular: Positive for chest pain. Gastrointestinal: Negative for abdominal pain, nausea and vomiting. Genitourinary: Negative for difficulty urinating. Musculoskeletal: Negative for back pain and neck pain. Neurological: Negative for dizziness, syncope and light-headedness. All other systems reviewed and are negative. Physical Exam   Physical Exam   Vital signs and nursing notes reviewed    CONSTITUTIONAL: Alert, in mild distress; well-developed; well-nourished. Tearful. HEAD:  Normocephalic, atraumatic  EYES: PERRL; EOM's intact. ENTM: Nose: no rhinorrhea; Throat: no erythema or exudate, mucous membranes moist  Neck:  Supple. trachea is midline. No JVD bilaterally. RESP: Chest clear, equal breath sounds. - W/R/R  CV: S1 and S2 WNL; No murmurs, gallops or rubs. 2+ radial and DP pulses bilaterally. Reproducible tenderness along the left chest wall both with direct palpation and with arm movement. GI: non-distended, normal bowel sounds, abdomen soft and non-tender. No masses or organomegaly. : No costo-vertebral angle tenderness. BACK:  Non-tender, normal appearance  UPPER EXT:  Normal inspection. no joint or soft tissue swelling  LOWER EXT: No edema, no calf tenderness. NEURO: Alert and oriented x3, 5/5 strength and light touch sensation intact in bilateral upper and lower extremities.   Reproducible numbness and tingling when tapping on the anterior distal left wrist.  SKIN: No rashes; Warm and dry  PSYCH: Normal mood, normal affect    Diagnostic Study Results     Labs -     Recent Results (from the past 12 hour(s))   EKG, 12 LEAD, INITIAL    Collection Time: 09/10/21 10:14 AM   Result Value Ref Range    Ventricular Rate 69 BPM    Atrial Rate 69 BPM    P-R Interval 178 ms    QRS Duration 84 ms    Q-T Interval 386 ms    QTC Calculation (Bezet) 413 ms    Calculated P Axis 49 degrees    Calculated R Axis 31 degrees    Calculated T Axis 25 degrees    Diagnosis       Normal sinus rhythm with sinus arrhythmia  Normal ECG  No previous ECGs available  Confirmed by Alphonso Alpers, Harpreet ([id number]) on 9/10/2021 1:32:18 PM     CBC WITH AUTOMATED DIFF    Collection Time: 09/10/21 10:18 AM   Result Value Ref Range    WBC 6.9 3.6 - 11.0 K/uL    RBC 4.79 3.80 - 5.20 M/uL    HGB 14.2 11.5 - 16.0 g/dL    HCT 44.0 35.0 - 47.0 %    MCV 91.9 80.0 - 99.0 FL    MCH 29.6 26.0 - 34.0 PG    MCHC 32.3 30.0 - 36.5 g/dL    RDW 12.3 11.5 - 14.5 %    PLATELET 473 586 - 972 K/uL    MPV 11.0 8.9 - 12.9 FL    NRBC 0.0 0  WBC    ABSOLUTE NRBC 0.00 0.00 - 0.01 K/uL    NEUTROPHILS 54 32 - 75 %    LYMPHOCYTES 34 12 - 49 %    MONOCYTES 6 5 - 13 %    EOSINOPHILS 4 0 - 7 %    BASOPHILS 2 (H) 0 - 1 %    IMMATURE GRANULOCYTES 0 0.0 - 0.5 %    ABS. NEUTROPHILS 3.7 1.8 - 8.0 K/UL    ABS. LYMPHOCYTES 2.3 0.8 - 3.5 K/UL    ABS. MONOCYTES 0.4 0.0 - 1.0 K/UL    ABS. EOSINOPHILS 0.3 0.0 - 0.4 K/UL    ABS. BASOPHILS 0.1 0.0 - 0.1 K/UL    ABS. IMM.  GRANS. 0.0 0.00 - 0.04 K/UL    DF AUTOMATED     METABOLIC PANEL, COMPREHENSIVE    Collection Time: 09/10/21 10:18 AM   Result Value Ref Range    Sodium 140 136 - 145 mmol/L    Potassium 4.0 3.5 - 5.1 mmol/L    Chloride 103 97 - 108 mmol/L    CO2 31 21 - 32 mmol/L    Anion gap 6 5 - 15 mmol/L    Glucose 93 65 - 100 mg/dL    BUN 11 6 - 20 MG/DL    Creatinine 0.96 0.55 - 1.02 MG/DL    BUN/Creatinine ratio 11 (L) 12 - 20      GFR est AA >60 >60 ml/min/1.73m2    GFR est non-AA >60 >60 ml/min/1.73m2    Calcium 9.2 8.5 - 10.1 MG/DL    Bilirubin, total 0.4 0.2 - 1.0 MG/DL    ALT (SGPT) 53 12 - 78 U/L    AST (SGOT) 35 15 - 37 U/L    Alk. phosphatase 101 45 - 117 U/L    Protein, total 8.1 6.4 - 8.2 g/dL    Albumin 3.6 3.5 - 5.0 g/dL    Globulin 4.5 (H) 2.0 - 4.0 g/dL    A-G Ratio 0.8 (L) 1.1 - 2.2     TROPONIN I    Collection Time: 09/10/21 10:18 AM   Result Value Ref Range    Troponin-I, Qt. <0.05 <0.05 ng/mL   TROPONIN I    Collection Time: 09/10/21 12:28 PM   Result Value Ref Range    Troponin-I, Qt. <0.05 <0.05 ng/mL       Radiologic Studies -   XR CHEST PORT   Final Result   No acute abnormality              CT Results  (Last 48 hours)    None        CXR Results  (Last 48 hours)               09/10/21 1058  XR CHEST PORT Final result    Impression:  No acute abnormality               Narrative:  EXAM:  XR CHEST PORT       INDICATION:  Chest Pain       COMPARISON:  2016       FINDINGS: A portable AP radiograph of the chest was obtained at 1056 hours. The   patient is on a cardiac monitor. The lungs are clear. The cardiac and   mediastinal contours and pulmonary vascularity are normal.  The bones and soft   tissues are grossly within normal limits. Medical Decision Making   I am the first provider for this patient. I reviewed the vital signs, available nursing notes, past medical history, past surgical history, family history and social history. Vital Signs-Reviewed the patient's vital signs. Patient Vitals for the past 12 hrs:   Temp Pulse Resp BP SpO2   09/10/21 1300  62 16 (!) 122/90 100 %   09/10/21 1200  61 18 129/87 100 %   09/10/21 1100  66 14 (!) 135/91 100 %   09/10/21 1022     100 %   09/10/21 1009 98.1 °F (36.7 °C) 72 16 (!) 140/95 100 %       EKG interpretation: (Preliminary)  EKG performed at 10:14 AM shows a normal sinus rhythm at a rate of 69, normal axis, normal intervals without visible acute ischemic changes.   EKG performed at 12:26 PM shows a sinus bradycardia at a rate of 59, normal axis, normal intervals without visible acute ischemic changes. Records Reviewed: Nursing notes, old medical records    Provider Notes (Medical Decision Making):   21-year-old female with at least 1 cardiac risk factor with reassuring EKGs and troponins x2, PERC negative for PE, no evidence of pericarditis, pneumothorax, pneumonia, pleural effusion. Suspect musculoskeletal etiology patient without any abdominal pain on exam to suggest referred GI source. Patient be safely discharged home with close cardiology follow-up which I instructed the patient given her family history of heart disease. ED Course:   Initial assessment performed. The patients presenting problems have been discussed, and they are in agreement with the care plan formulated and outlined with them. I have encouraged them to ask questions as they arise throughout their visit. Disposition:  Discharge    Discharge Note:  1:35 PM  The pt is ready for discharge. The pt's signs, symptoms, diagnosis, and discharge instructions have been discussed and pt has conveyed their understanding. The pt is to follow up as recommended or return to ER should their symptoms worsen. Plan has been discussed and pt is in agreement. DISCHARGE PLAN:  1. Discharge Medication List as of 9/10/2021  1:34 PM        2. Follow-up Information     Follow up With Specialties Details Why Contact Info    \[facility]\"" EMERGENCY DEPT Emergency Medicine  If symptoms worsen including worsening chest pain, new difficulty breathing, if you pass out or new onset weakness in your face arms or legs. [address] [place], [place] [address]way  [facility]   Please call [facility] cardiovascular specialist at [phone] for a follow-up appointment after today's chest pain visit. [address]  [phone]        3.   Return to ED if worse Diagnosis     Clinical Impression:   1. Acute chest pain    2. Family history of coronary artery disease        Attestations:    Jenelle Abdi MD    Please note that this dictation was completed with [employer], the computer voice recognition software. Quite often unanticipated grammatical, syntax, homophones, and other interpretive errors are inadvertently transcribed by the computer software. Please disregard these errors. Please excuse any errors that have escaped final proofreading. Thank you.
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
## 2021-09-10 NOTE — ED NOTES
1015: Assumed care of patient at this time, patient on monitor x 3, SR x 1, call light in reach, family is bedside. Patient presents to ED with complaints of Left sided chest pain that has been constant for a few weeks, pt also states that her left hand feels like it is tingling. 1020: Doctor at bedside    [id number]: Doctor reviewed discharge instructions with the patient. The patient verbalized understanding.

## 2021-09-10 NOTE — Clinical Note
[address]  [facility] EMERGENCY DEPT  [address] [address]  [phone]    Work/School Note    Date: 9/10/2021    To Whom It May concern:      Harrison Serrato was seen and treated today in the emergency room by the following provider(s):  Attending Provider: Tamara Charles MD.      Harrison Serrato is excused from work/school on 09/10/21. She is clear to return to work/school on 09/11/21.         Sincerely,          Kathryn Rojas MD

## 2021-09-11 RX ORDER — IBUPROFEN 800 MG/1
800 TABLET ORAL
Qty: 24 TABLET | Refills: 0 | Status: SHIPPED | OUTPATIENT
Start: 2021-09-11 | End: 2022-04-16

## 2021-09-11 NOTE — ED NOTES
Charge nurse received call from patient, requesting ibuprofen. Seen yesterday by Dr. Pepito Bradley. No ibuprofen sent. Sent prescription for 800 mg of ibuprofen to patient's pharmacy.

## 2021-12-13 ENCOUNTER — APPOINTMENT (OUTPATIENT)
Dept: CT IMAGING | Age: 51
End: 2021-12-13
Attending: EMERGENCY MEDICINE
Payer: COMMERCIAL

## 2021-12-13 ENCOUNTER — HOSPITAL ENCOUNTER (EMERGENCY)
Age: 51
Discharge: HOME | End: 2021-12-13
Attending: EMERGENCY MEDICINE | Admitting: EMERGENCY MEDICINE
Payer: COMMERCIAL

## 2021-12-13 VITALS
BODY MASS INDEX: 38.04 KG/M2 | DIASTOLIC BLOOD PRESSURE: 66 MMHG | HEIGHT: 61 IN | SYSTOLIC BLOOD PRESSURE: 120 MMHG | HEART RATE: 101 BPM | RESPIRATION RATE: 20 BRPM | TEMPERATURE: 98.8 F | WEIGHT: 201.5 LBS | OXYGEN SATURATION: 100 %

## 2021-12-13 DIAGNOSIS — Z98.890 S/P ABDOMINOPLASTY: ICD-10-CM

## 2021-12-13 DIAGNOSIS — S30.1XXA ABDOMINAL WALL SEROMA, INITIAL ENCOUNTER: Primary | ICD-10-CM

## 2021-12-13 LAB
ALBUMIN SERPL-MCNC: 3 G/DL (ref 3.5–5)
ALBUMIN/GLOB SERPL: 0.6 {RATIO} (ref 1.1–2.2)
ALP SERPL-CCNC: 118 U/L (ref 45–117)
ALT SERPL-CCNC: 32 U/L (ref 12–78)
ANION GAP SERPL CALC-SCNC: 8 MMOL/L (ref 5–15)
AST SERPL-CCNC: 25 U/L (ref 15–37)
BASOPHILS # BLD: 0.1 K/UL (ref 0–0.1)
BASOPHILS NFR BLD: 1 % (ref 0–1)
BILIRUB SERPL-MCNC: 0.4 MG/DL (ref 0.2–1)
BUN SERPL-MCNC: 8 MG/DL (ref 6–20)
BUN/CREAT SERPL: 8 (ref 12–20)
CALCIUM SERPL-MCNC: 9.3 MG/DL (ref 8.5–10.1)
CHLORIDE SERPL-SCNC: 102 MMOL/L (ref 97–108)
CO2 SERPL-SCNC: 26 MMOL/L (ref 21–32)
CREAT SERPL-MCNC: 1.05 MG/DL (ref 0.55–1.02)
DIFFERENTIAL METHOD BLD: ABNORMAL
EOSINOPHIL # BLD: 0.3 K/UL (ref 0–0.4)
EOSINOPHIL NFR BLD: 3 % (ref 0–7)
ERYTHROCYTE [DISTWIDTH] IN BLOOD BY AUTOMATED COUNT: 11.9 % (ref 11.5–14.5)
GLOBULIN SER CALC-MCNC: 5.1 G/DL (ref 2–4)
GLUCOSE SERPL-MCNC: 103 MG/DL (ref 65–100)
HCT VFR BLD AUTO: 37.7 % (ref 35–47)
HGB BLD-MCNC: 12.5 G/DL (ref 11.5–16)
IMM GRANULOCYTES # BLD AUTO: 0.1 K/UL (ref 0–0.04)
IMM GRANULOCYTES NFR BLD AUTO: 1 % (ref 0–0.5)
LIPASE SERPL-CCNC: 124 U/L (ref 73–393)
LYMPHOCYTES # BLD: 2.5 K/UL (ref 0.8–3.5)
LYMPHOCYTES NFR BLD: 25 % (ref 12–49)
MCH RBC QN AUTO: 28.7 PG (ref 26–34)
MCHC RBC AUTO-ENTMCNC: 33.2 G/DL (ref 30–36.5)
MCV RBC AUTO: 86.7 FL (ref 80–99)
MONOCYTES # BLD: 0.7 K/UL (ref 0–1)
MONOCYTES NFR BLD: 7 % (ref 5–13)
NEUTS SEG # BLD: 6.7 K/UL (ref 1.8–8)
NEUTS SEG NFR BLD: 63 % (ref 32–75)
NRBC # BLD: 0 K/UL (ref 0–0.01)
NRBC BLD-RTO: 0 PER 100 WBC
PLATELET # BLD AUTO: 440 K/UL (ref 150–400)
PMV BLD AUTO: 9.7 FL (ref 8.9–12.9)
POTASSIUM SERPL-SCNC: 3.9 MMOL/L (ref 3.5–5.1)
PROT SERPL-MCNC: 8.1 G/DL (ref 6.4–8.2)
RBC # BLD AUTO: 4.35 M/UL (ref 3.8–5.2)
SODIUM SERPL-SCNC: 136 MMOL/L (ref 136–145)
WBC # BLD AUTO: 10.3 K/UL (ref 3.6–11)

## 2021-12-13 PROCEDURE — 96375 TX/PRO/DX INJ NEW DRUG ADDON: CPT

## 2021-12-13 PROCEDURE — 36415 COLL VENOUS BLD VENIPUNCTURE: CPT

## 2021-12-13 PROCEDURE — 83690 ASSAY OF LIPASE: CPT

## 2021-12-13 PROCEDURE — 96374 THER/PROPH/DIAG INJ IV PUSH: CPT

## 2021-12-13 PROCEDURE — 74177 CT ABD & PELVIS W/CONTRAST: CPT

## 2021-12-13 PROCEDURE — 74011250637 HC RX REV CODE- 250/637: Performed by: EMERGENCY MEDICINE

## 2021-12-13 PROCEDURE — 80053 COMPREHEN METABOLIC PANEL: CPT

## 2021-12-13 PROCEDURE — 99284 EMERGENCY DEPT VISIT MOD MDM: CPT

## 2021-12-13 PROCEDURE — 74011250636 HC RX REV CODE- 250/636: Performed by: EMERGENCY MEDICINE

## 2021-12-13 PROCEDURE — 85025 COMPLETE CBC W/AUTO DIFF WBC: CPT

## 2021-12-13 PROCEDURE — 74011000636 HC RX REV CODE- 636: Performed by: EMERGENCY MEDICINE

## 2021-12-13 RX ORDER — DOXYCYCLINE HYCLATE 100 MG
100 TABLET ORAL
Status: COMPLETED | OUTPATIENT
Start: 2021-12-13 | End: 2021-12-13

## 2021-12-13 RX ORDER — ONDANSETRON 2 MG/ML
4 INJECTION INTRAMUSCULAR; INTRAVENOUS
Status: COMPLETED | OUTPATIENT
Start: 2021-12-13 | End: 2021-12-13

## 2021-12-13 RX ORDER — FENTANYL CITRATE 50 UG/ML
100 INJECTION, SOLUTION INTRAMUSCULAR; INTRAVENOUS
Status: COMPLETED | OUTPATIENT
Start: 2021-12-13 | End: 2021-12-13

## 2021-12-13 RX ORDER — DOXYCYCLINE HYCLATE 100 MG
100 TABLET ORAL 2 TIMES DAILY
Qty: 20 TABLET | Refills: 0 | Status: SHIPPED | OUTPATIENT
Start: 2021-12-13 | End: 2021-12-23

## 2021-12-13 RX ADMIN — IOPAMIDOL 100 ML: 755 INJECTION, SOLUTION INTRAVENOUS at 06:15

## 2021-12-13 RX ADMIN — FENTANYL CITRATE 100 MCG: 50 INJECTION INTRAMUSCULAR; INTRAVENOUS at 04:35

## 2021-12-13 RX ADMIN — ONDANSETRON 4 MG: 2 INJECTION INTRAMUSCULAR; INTRAVENOUS at 04:34

## 2021-12-13 RX ADMIN — SODIUM CHLORIDE 1000 ML: 9 INJECTION, SOLUTION INTRAVENOUS at 04:33

## 2021-12-13 RX ADMIN — IOHEXOL 50 ML: 240 INJECTION, SOLUTION INTRATHECAL; INTRAVASCULAR; INTRAVENOUS; ORAL at 04:42

## 2021-12-13 RX ADMIN — DOXYCYCLINE HYCLATE 100 MG: 100 TABLET, COATED ORAL at 07:31

## 2021-12-13 NOTE — ED NOTES
Bedside and Verbal shift change report given to Eric Burks Cristin (oncoming nurse) by Justus Pino (offgoing nurse). Report included the following information SBAR, ED Summary, Intake/Output and MAR.

## 2021-12-13 NOTE — ED NOTES
Pt ambulatory to room from triage. Pt here today with complaints of abd pain secondary to plastic surgery. PT states that she had abd plastic surgery 3 weeks ago. Pt states that she has not been able to eat or drink and something just feels off. PT abd incisions appear to be healing approprietly with no signs of infection. Pt denies chest pain, sob, diarrhea, fever. Pt ANOX4, respirations even and unlabored, skin warm dry and intact, NAD noted.

## 2021-12-14 ENCOUNTER — HOSPITAL ENCOUNTER (EMERGENCY)
Age: 51
Discharge: HOME | End: 2021-12-15
Attending: EMERGENCY MEDICINE
Payer: COMMERCIAL

## 2021-12-14 DIAGNOSIS — R53.83 MALAISE AND FATIGUE: Primary | ICD-10-CM

## 2021-12-14 DIAGNOSIS — S30.1XXA ABDOMINAL WALL SEROMA, INITIAL ENCOUNTER: ICD-10-CM

## 2021-12-14 DIAGNOSIS — R11.2 NON-INTRACTABLE VOMITING WITH NAUSEA, UNSPECIFIED VOMITING TYPE: ICD-10-CM

## 2021-12-14 DIAGNOSIS — R53.81 MALAISE AND FATIGUE: Primary | ICD-10-CM

## 2021-12-14 DIAGNOSIS — R10.9 ABDOMINAL CRAMPING: ICD-10-CM

## 2021-12-14 LAB
ALBUMIN SERPL-MCNC: 3.4 G/DL (ref 3.5–5)
ALBUMIN/GLOB SERPL: 0.7 {RATIO} (ref 1.1–2.2)
ALP SERPL-CCNC: 121 U/L (ref 45–117)
ALT SERPL-CCNC: 28 U/L (ref 12–78)
ANION GAP SERPL CALC-SCNC: 9 MMOL/L (ref 5–15)
APPEARANCE UR: CLEAR
AST SERPL-CCNC: 23 U/L (ref 15–37)
BACTERIA URNS QL MICRO: NEGATIVE /HPF
BASOPHILS # BLD: 0.1 K/UL (ref 0–0.1)
BASOPHILS NFR BLD: 1 % (ref 0–1)
BILIRUB SERPL-MCNC: 0.4 MG/DL (ref 0.2–1)
BILIRUB UR QL CFM: NEGATIVE
BUN SERPL-MCNC: 9 MG/DL (ref 6–20)
BUN/CREAT SERPL: 10 (ref 12–20)
CALCIUM SERPL-MCNC: 9.8 MG/DL (ref 8.5–10.1)
CHLORIDE SERPL-SCNC: 102 MMOL/L (ref 97–108)
CO2 SERPL-SCNC: 27 MMOL/L (ref 21–32)
COLOR UR: ABNORMAL
CREAT SERPL-MCNC: 0.92 MG/DL (ref 0.55–1.02)
DIFFERENTIAL METHOD BLD: ABNORMAL
EOSINOPHIL # BLD: 0.2 K/UL (ref 0–0.4)
EOSINOPHIL NFR BLD: 2 % (ref 0–7)
EPITH CASTS URNS QL MICRO: ABNORMAL /LPF
ERYTHROCYTE [DISTWIDTH] IN BLOOD BY AUTOMATED COUNT: 12 % (ref 11.5–14.5)
GLOBULIN SER CALC-MCNC: 5.2 G/DL (ref 2–4)
GLUCOSE SERPL-MCNC: 87 MG/DL (ref 65–100)
GLUCOSE UR STRIP.AUTO-MCNC: NEGATIVE MG/DL
HCT VFR BLD AUTO: 39.3 % (ref 35–47)
HGB BLD-MCNC: 13 G/DL (ref 11.5–16)
HGB UR QL STRIP: NEGATIVE
HYALINE CASTS URNS QL MICRO: ABNORMAL /LPF (ref 0–5)
IMM GRANULOCYTES # BLD AUTO: 0.1 K/UL (ref 0–0.04)
IMM GRANULOCYTES NFR BLD AUTO: 0 % (ref 0–0.5)
KETONES UR QL STRIP.AUTO: 80 MG/DL
LACTATE BLD-SCNC: 0.62 MMOL/L (ref 0.4–2)
LEUKOCYTE ESTERASE UR QL STRIP.AUTO: NEGATIVE
LYMPHOCYTES # BLD: 3.3 K/UL (ref 0.8–3.5)
LYMPHOCYTES NFR BLD: 24 % (ref 12–49)
MCH RBC QN AUTO: 29.1 PG (ref 26–34)
MCHC RBC AUTO-ENTMCNC: 33.1 G/DL (ref 30–36.5)
MCV RBC AUTO: 88.1 FL (ref 80–99)
MONOCYTES # BLD: 0.7 K/UL (ref 0–1)
MONOCYTES NFR BLD: 5 % (ref 5–13)
NEUTS SEG # BLD: 9.3 K/UL (ref 1.8–8)
NEUTS SEG NFR BLD: 68 % (ref 32–75)
NITRITE UR QL STRIP.AUTO: NEGATIVE
NRBC # BLD: 0 K/UL (ref 0–0.01)
NRBC BLD-RTO: 0 PER 100 WBC
PH UR STRIP: 6 [PH] (ref 5–8)
PLATELET # BLD AUTO: 486 K/UL (ref 150–400)
PMV BLD AUTO: 9.5 FL (ref 8.9–12.9)
POTASSIUM SERPL-SCNC: 4.2 MMOL/L (ref 3.5–5.1)
PROT SERPL-MCNC: 8.6 G/DL (ref 6.4–8.2)
PROT UR STRIP-MCNC: NEGATIVE MG/DL
RBC # BLD AUTO: 4.46 M/UL (ref 3.8–5.2)
RBC #/AREA URNS HPF: ABNORMAL /HPF (ref 0–5)
SODIUM SERPL-SCNC: 138 MMOL/L (ref 136–145)
SP GR UR REFRACTOMETRY: 1.02 (ref 1–1.03)
UA: UC IF INDICATED,UAUC: ABNORMAL
UROBILINOGEN UR QL STRIP.AUTO: 1 EU/DL (ref 0.2–1)
WBC # BLD AUTO: 13.7 K/UL (ref 3.6–11)
WBC URNS QL MICRO: ABNORMAL /HPF (ref 0–4)

## 2021-12-14 PROCEDURE — 83605 ASSAY OF LACTIC ACID: CPT

## 2021-12-14 PROCEDURE — 96375 TX/PRO/DX INJ NEW DRUG ADDON: CPT

## 2021-12-14 PROCEDURE — 99284 EMERGENCY DEPT VISIT MOD MDM: CPT

## 2021-12-14 PROCEDURE — 80053 COMPREHEN METABOLIC PANEL: CPT

## 2021-12-14 PROCEDURE — 87040 BLOOD CULTURE FOR BACTERIA: CPT

## 2021-12-14 PROCEDURE — 85025 COMPLETE CBC W/AUTO DIFF WBC: CPT

## 2021-12-14 PROCEDURE — 96361 HYDRATE IV INFUSION ADD-ON: CPT

## 2021-12-14 PROCEDURE — 74011250636 HC RX REV CODE- 250/636: Performed by: EMERGENCY MEDICINE

## 2021-12-14 PROCEDURE — 36415 COLL VENOUS BLD VENIPUNCTURE: CPT

## 2021-12-14 PROCEDURE — 81001 URINALYSIS AUTO W/SCOPE: CPT

## 2021-12-14 RX ORDER — PROMETHAZINE HYDROCHLORIDE 25 MG/1
25 SUPPOSITORY RECTAL
Qty: 12 SUPPOSITORY | Refills: 0 | Status: SHIPPED | OUTPATIENT
Start: 2021-12-14 | End: 2021-12-21

## 2021-12-14 RX ORDER — DIAZEPAM 10 MG/2ML
2 INJECTION INTRAMUSCULAR
Status: COMPLETED | OUTPATIENT
Start: 2021-12-14 | End: 2021-12-14

## 2021-12-14 RX ORDER — METOCLOPRAMIDE HYDROCHLORIDE 5 MG/ML
10 INJECTION INTRAMUSCULAR; INTRAVENOUS
Status: COMPLETED | OUTPATIENT
Start: 2021-12-14 | End: 2021-12-14

## 2021-12-14 RX ORDER — ONDANSETRON 2 MG/ML
4 INJECTION INTRAMUSCULAR; INTRAVENOUS
Status: COMPLETED | OUTPATIENT
Start: 2021-12-14 | End: 2021-12-14

## 2021-12-14 RX ADMIN — DIAZEPAM 2 MG: 5 INJECTION, SOLUTION INTRAMUSCULAR; INTRAVENOUS at 23:11

## 2021-12-14 RX ADMIN — METOCLOPRAMIDE 10 MG: 5 INJECTION, SOLUTION INTRAMUSCULAR; INTRAVENOUS at 23:09

## 2021-12-14 RX ADMIN — SODIUM CHLORIDE 1000 ML: 9 INJECTION, SOLUTION INTRAVENOUS at 22:26

## 2021-12-14 RX ADMIN — ONDANSETRON 4 MG: 2 INJECTION INTRAMUSCULAR; INTRAVENOUS at 22:31

## 2021-12-14 NOTE — ED PROVIDER NOTES
EMERGENCY DEPARTMENT HISTORY AND PHYSICAL EXAM      Date: 12/13/2021  Patient Name: Mallory Zamora    History of Presenting Illness     Chief Complaint   Patient presents with    Nausea     ED visit nausea / worsening general abd pain s/p surgery - onset of sxs, 3 to 4 days ago - reports having plastic surgery to abd region, took place 3 wks ago in [place] [id number] - also reports constipation requiring enemas - Deneis fevers / chills         History Provided By: Patient    HPI: Mallory Zamora, 54 y.o. female presents to the ED with cc of abdominal discomfort and fatigue moderate in severity. Pt had abdominoplasty in [place] 3 weeks ago. She had been doing well, she has been having constipation. She did use enema x 2, and been moving her bowels. For the past two days she has had increase fatigue, decrease appetite. She has early satiety. She feels bloated and is not in any significant pain. There has been no fever or chills. She denies any chest pain or shortness of breath. There has been no urinary symptoms. She states the wound has some mild warmth and erythema. There are no other complaints, changes, or physical findings at this time. PCP: Andrea Houston NP    No current facility-administered medications on file prior to encounter. Current Outpatient Medications on File Prior to Encounter   Medication Sig Dispense Refill    ibuprofen (MOTRIN) 800 mg tablet Take 1 Tablet by mouth every eight (8) hours as needed for Pain. 24 Tablet 0    levothyroxine (SYNTHROID) 50 mcg tablet TAKE 1 TAB BY MOUTH DAILY (BEFORE BREAKFAST) 90 Tab 1    ibuprofen (MOTRIN) 800 mg tablet Take 1 Tab by mouth every six (6) hours as needed for Pain. 60 Tab 0    sucralfate (CARAFATE) 100 mg/mL suspension Take  by mouth four (4) times daily.  calcium carbonate/vitamin D3 (CALCIUM 500 + D PO) Take  by mouth.  docusate sodium (Colace) 100 mg capsule Take 100 mg by mouth two (2) times a day.       cyclobenzaprine (FLEXERIL) 10 mg tablet Take  by mouth three (3) times daily as needed for Muscle Spasm(s).  fluconazole (DIFLUCAN) 150 mg tablet Take 150 mg by mouth daily. FDA advises cautious prescribing of oral fluconazole in pregnancy.  ketorolac (TORADOL) 10 mg tablet Take  by mouth.  meloxicam (MOBIC) 7.5 mg tablet Take  by mouth daily.  ondansetron hcl (ZOFRAN) 8 mg tablet Take 8 mg by mouth every eight (8) hours as needed for Nausea or Vomiting.  oseltamivir (TAMIFLU) 75 mg capsule Take  by mouth.  traMADoL (ULTRAM) 50 mg tablet Take 50 mg by mouth every six (6) hours as needed for Pain.  ursodioL (ACTIGALL) 300 mg capsule Take 300 mg by mouth two (2) times a day.  albuterol (Ventolin HFA) 90 mcg/actuation inhaler Take  by inhalation.  norethindrone-ethinyl estradiol (Junel FE , ,) 1 mg-20 mcg (21)/75 mg (7) tab Take  by mouth.  polyethylene glycol (MIRALAX) 17 gram packet Take 1 packet (17 Gm) per label directions 2 x day until BMs normalize then cut back dosing as needed to maintain at least every other day BMs. Stay well hydrated. 60 Packet 5    multivitamin (ONE A DAY) tablet Take 1 Tab by mouth daily.  omega 3-dha-epa-fish oil 100-160-1,000 mg cap Take  by mouth.  Biotin 2,500 mcg cap Take  by mouth. Past History     Past Medical History:  Past Medical History:   Diagnosis Date    GERD (gastroesophageal reflux disease)     Hiatal hernia     Hypercholesterolemia     Hypertension     Thyroid disease        Past Surgical History:  Past Surgical History:   Procedure Laterality Date    HX CARPAL TUNNEL RELEASE      HX  SECTION      HX GI      gastric sleeves    MI LAP,TUBAL CAUTERY         Family History:  Family History   Problem Relation Age of Onset    Heart Disease Father        Social History:  Social History     Tobacco Use    Smoking status: Never Smoker    Smokeless tobacco: Never Used   Substance Use Topics    Alcohol use:  Yes    Drug use: Never       Allergies:  No Known Allergies      Review of Systems   Review of Systems   Constitutional: Positive for appetite change and fatigue. Negative for chills and fever. HENT: Negative. Negative for congestion, rhinorrhea, sinus pressure and sore throat. Eyes: Negative. Respiratory: Negative. Negative for cough, choking, chest tightness, shortness of breath and wheezing. Cardiovascular: Negative. Negative for chest pain, palpitations and leg swelling. Gastrointestinal: Positive for abdominal pain. Negative for constipation, diarrhea, nausea and vomiting. Feeling bloated      Endocrine: Negative. Genitourinary: Negative. Negative for difficulty urinating, dysuria, flank pain and urgency. Musculoskeletal: Negative. Skin: Negative. Neurological: Negative. Negative for dizziness, speech difficulty, weakness, light-headedness, numbness and headaches. Psychiatric/Behavioral: Negative. All other systems reviewed and are negative. Physical Exam   Physical Exam  Vitals and nursing note reviewed. Constitutional:       General: She is not in acute distress. Appearance: She is well-developed. She is obese. She is not diaphoretic. HENT:      Head: Normocephalic and atraumatic. Mouth/Throat:      Mouth: Mucous membranes are dry. Pharynx: No oropharyngeal exudate. Eyes:      Conjunctiva/sclera: Conjunctivae normal.      Pupils: Pupils are equal, round, and reactive to light. Neck:      Vascular: No JVD. Trachea: No tracheal deviation. Cardiovascular:      Rate and Rhythm: Normal rate and regular rhythm. Heart sounds: Normal heart sounds. No murmur heard. Pulmonary:      Effort: Pulmonary effort is normal. No respiratory distress. Breath sounds: Normal breath sounds. No stridor. No wheezing or rales. Abdominal:      General: There is no distension. Palpations: Abdomen is soft. Tenderness:  There is no abdominal tenderness. There is no guarding or rebound. Musculoskeletal:         General: No tenderness. Normal range of motion. Cervical back: Normal range of motion and neck supple. Skin:     General: Skin is warm and dry. Neurological:      Mental Status: She is alert and oriented to person, place, and time. Cranial Nerves: No cranial nerve deficit. Comments: No gross motor or sensory deficits    Psychiatric:         Behavior: Behavior normal.         Diagnostic Study Results     Labs -  Recent Results (from the past 24 hour(s))   CBC WITH AUTOMATED DIFF    Collection Time: 12/13/21  4:24 AM   Result Value Ref Range    WBC 10.3 3.6 - 11.0 K/uL    RBC 4.35 3.80 - 5.20 M/uL    HGB 12.5 11.5 - 16.0 g/dL    HCT 37.7 35.0 - 47.0 %    MCV 86.7 80.0 - 99.0 FL    MCH 28.7 26.0 - 34.0 PG    MCHC 33.2 30.0 - 36.5 g/dL    RDW 11.9 11.5 - 14.5 %    PLATELET 720 (H) 846 - 400 K/uL    MPV 9.7 8.9 - 12.9 FL    NRBC 0.0 0  WBC    ABSOLUTE NRBC 0.00 0.00 - 0.01 K/uL    NEUTROPHILS 63 32 - 75 %    LYMPHOCYTES 25 12 - 49 %    MONOCYTES 7 5 - 13 %    EOSINOPHILS 3 0 - 7 %    BASOPHILS 1 0 - 1 %    IMMATURE GRANULOCYTES 1 (H) 0.0 - 0.5 %    ABS. NEUTROPHILS 6.7 1.8 - 8.0 K/UL    ABS. LYMPHOCYTES 2.5 0.8 - 3.5 K/UL    ABS. MONOCYTES 0.7 0.0 - 1.0 K/UL    ABS. EOSINOPHILS 0.3 0.0 - 0.4 K/UL    ABS. BASOPHILS 0.1 0.0 - 0.1 K/UL    ABS. IMM.  GRANS. 0.1 (H) 0.00 - 0.04 K/UL    DF AUTOMATED     METABOLIC PANEL, COMPREHENSIVE    Collection Time: 12/13/21  4:24 AM   Result Value Ref Range    Sodium 136 136 - 145 mmol/L    Potassium 3.9 3.5 - 5.1 mmol/L    Chloride 102 97 - 108 mmol/L    CO2 26 21 - 32 mmol/L    Anion gap 8 5 - 15 mmol/L    Glucose 103 (H) 65 - 100 mg/dL    BUN 8 6 - 20 MG/DL    Creatinine 1.05 (H) 0.55 - 1.02 MG/DL    BUN/Creatinine ratio 8 (L) 12 - 20      GFR est AA >60 >60 ml/min/1.73m2    GFR est non-AA 56 (L) >60 ml/min/1.73m2    Calcium 9.3 8.5 - 10.1 MG/DL    Bilirubin, total 0.4 0.2 - 1.0 MG/DL ALT (SGPT) 32 12 - 78 U/L    AST (SGOT) 25 15 - 37 U/L    Alk. phosphatase 118 (H) 45 - 117 U/L    Protein, total 8.1 6.4 - 8.2 g/dL    Albumin 3.0 (L) 3.5 - 5.0 g/dL    Globulin 5.1 (H) 2.0 - 4.0 g/dL    A-G Ratio 0.6 (L) 1.1 - 2.2     LIPASE    Collection Time: 12/13/21  4:24 AM   Result Value Ref Range    Lipase 124 73 - 393 U/L       Radiologic Studies -   CT ABD PELV W CONT   Final Result      1. No significant intra-abdominal finding status post abdominoplasty and gastric   sleeve surgery. 2. There is stranding in the anterior abdominal wall and there are 2 separate   collections on the right. Correlate clinically for signs of infection. CT Results  (Last 48 hours)               12/13/21 0615  CT ABD PELV W CONT Final result    Impression:      1. No significant intra-abdominal finding status post abdominoplasty and gastric   sleeve surgery. 2. There is stranding in the anterior abdominal wall and there are 2 separate   collections on the right. Correlate clinically for signs of infection. Narrative:  EXAM: CT ABD PELV W CONT       INDICATION: Pain in the abdomen, 3 weeks post-op abdomnioplasty, nauseated, hx   of gastric sleeve       COMPARISON: None        CONTRAST: 100 mL of Isovue-370. TECHNIQUE:    Following the uneventful intravenous administration of contrast, thin axial   images were obtained through the abdomen and pelvis. Coronal and sagittal   reconstructions were generated. Oral contrast was administered. CT dose   reduction was achieved through use of a standardized protocol tailored for this   examination and automatic exposure control for dose modulation. FINDINGS:    LOWER THORAX: No significant abnormality in the incidentally imaged lower chest.   LIVER: No mass. BILIARY TREE: Gallbladder is within normal limits. CBD is not dilated. SPLEEN: within normal limits. PANCREAS: No mass or ductal dilatation. ADRENALS: Unremarkable.    KIDNEYS: No mass, calculus, or hydronephrosis. STOMACH: Gastric sleeve. SMALL BOWEL: No dilatation or wall thickening. COLON: No dilatation or wall thickening. APPENDIX: Normal.   PERITONEUM: No ascites or pneumoperitoneum. RETROPERITONEUM: No lymphadenopathy or aortic aneurysm. REPRODUCTIVE ORGANS: Normal.   URINARY BLADDER: No mass or calculus. BONES: No destructive bone lesion. ABDOMINAL WALL: Stranding in the anterior abdominal wall. 3.4 x 1.9  x 0.9 cm   collection right abdominal wall. Second collection right lateral abdominal wall   measures approximately 6.8 x 1.4 x 1.9  cm. ADDITIONAL COMMENTS: N/A               CXR Results  (Last 48 hours)    None          Medical Decision Making   I am the first provider for this patient. I reviewed the vital signs, available nursing notes, past medical history, past surgical history, family history and social history. Vital Signs-Reviewed the patient's vital signs. Records Reviewed: Nursing Notes, Old Medical Records, Previous Radiology Studies and Previous Laboratory Studies, abdominoplasty in [place], followed by GYN for AUB last office visit 3/22/2021    Provider Notes (Medical Decision Making): Abdominal wall cellulitis, post-op seroma. Intra-abdominal abscess, dehydration, electrolyte abnormality    ED Course:   Initial assessment performed. The patients presenting problems have been discussed, and they are in agreement with the care plan formulated and outlined with them. I have encouraged them to ask questions as they arise throughout their visit. Pt not septic appearing, lactate wnl, no sig leukocytosis, no fever. Will start Doxycycline given generalized warmth. Likely seromas, will provide pt with a copy of her CT, discussed with her to follow up with her Plastic Surgeon as these will likely need to be drained. Disposition:  DC home     DISCHARGE PLAN:  1.    Discharge Medication List as of 12/13/2021  7:32 AM      START taking these medications Details   doxycycline (VIBRA-TABS) 100 mg tablet Take 1 Tablet by mouth two (2) times a day for 10 days. , Normal, Disp-20 Tablet, R-0         CONTINUE these medications which have NOT CHANGED    Details   !! ibuprofen (MOTRIN) 800 mg tablet Take 1 Tablet by mouth every eight (8) hours as needed for Pain., Normal, Disp-24 Tablet, R-0      levothyroxine (SYNTHROID) 50 mcg tablet TAKE 1 TAB BY MOUTH DAILY (BEFORE BREAKFAST), Normal, Disp-90 Tab, R-1      !! ibuprofen (MOTRIN) 800 mg tablet Take 1 Tab by mouth every six (6) hours as needed for Pain., Normal, Disp-60 Tab, R-0      sucralfate (CARAFATE) 100 mg/mL suspension Take  by mouth four (4) times daily. , Historical Med      calcium carbonate/vitamin D3 (CALCIUM 500 + D PO) Take  by mouth., Historical Med      docusate sodium (Colace) 100 mg capsule Take 100 mg by mouth two (2) times a day., Historical Med      cyclobenzaprine (FLEXERIL) 10 mg tablet Take  by mouth three (3) times daily as needed for Muscle Spasm(s). , Historical Med      fluconazole (DIFLUCAN) 150 mg tablet Take 150 mg by mouth daily. FDA advises cautious prescribing of oral fluconazole in pregnancy. , Historical Med      ketorolac (TORADOL) 10 mg tablet Take  by mouth., Historical Med      meloxicam (MOBIC) 7.5 mg tablet Take  by mouth daily. , Historical Med      ondansetron hcl (ZOFRAN) 8 mg tablet Take 8 mg by mouth every eight (8) hours as needed for Nausea or Vomiting., Historical Med      oseltamivir (TAMIFLU) 75 mg capsule Take  by mouth., Historical Med      traMADoL (ULTRAM) 50 mg tablet Take 50 mg by mouth every six (6) hours as needed for Pain., Historical Med      ursodioL (ACTIGALL) 300 mg capsule Take 300 mg by mouth two (2) times a day., Historical Med      albuterol (Ventolin HFA) 90 mcg/actuation inhaler Take  by inhalation. , Historical Med      norethindrone-ethinyl estradiol (Junel FE 1/20, 28,) 1 mg-20 mcg (21)/75 mg (7) tab Take  by mouth., Historical Med      polyethylene glycol (MIRALAX) 17 gram packet Take 1 packet (17 Gm) per label directions 2 x day until BMs normalize then cut back dosing as needed to maintain at least every other day BMs. Stay well hydrated., Normal, Disp-60 Packet,R-5May substitute container if packets not covered. multivitamin (ONE A DAY) tablet Take 1 Tab by mouth daily. , Historical Med      omega 3-dha-epa-fish oil 100-160-1,000 mg cap Take  by mouth., Historical Med      Biotin 2,500 mcg cap Take  by mouth., Historical Med       !! - Potential duplicate medications found. Please discuss with provider. 2.   Follow-up Information    None       3. Return to ED if worse     Diagnosis     Clinical Impression:   1. Abdominal wall seroma, initial encounter    2. S/P abdominoplasty        Attestations:    Valeria Cuadra, DO    Please note that this dictation was completed with [employer], the computer voice recognition software. Quite often unanticipated grammatical, syntax, homophones, and other interpretive errors are inadvertently transcribed by the computer software. Please disregard these errors. Please excuse any errors that have escaped final proofreading. Thank you.

## 2021-12-15 VITALS
BODY MASS INDEX: 37.84 KG/M2 | TEMPERATURE: 98.2 F | HEIGHT: 61 IN | HEART RATE: 91 BPM | DIASTOLIC BLOOD PRESSURE: 74 MMHG | WEIGHT: 200.4 LBS | SYSTOLIC BLOOD PRESSURE: 130 MMHG | RESPIRATION RATE: 18 BRPM | OXYGEN SATURATION: 100 %

## 2021-12-15 PROCEDURE — 96365 THER/PROPH/DIAG IV INF INIT: CPT

## 2021-12-15 PROCEDURE — 74011250636 HC RX REV CODE- 250/636: Performed by: EMERGENCY MEDICINE

## 2021-12-15 PROCEDURE — 74011000258 HC RX REV CODE- 258: Performed by: EMERGENCY MEDICINE

## 2021-12-15 PROCEDURE — 74011250636 HC RX REV CODE- 250/636

## 2021-12-15 RX ORDER — HALOPERIDOL 5 MG/ML
INJECTION INTRAMUSCULAR
Status: COMPLETED
Start: 2021-12-15 | End: 2021-12-15

## 2021-12-15 RX ORDER — HYOSCYAMINE SULFATE 0.12 MG/1
0.25 TABLET SUBLINGUAL
Qty: 20 TABLET | Refills: 0 | Status: SHIPPED | OUTPATIENT
Start: 2021-12-15 | End: 2022-04-16

## 2021-12-15 RX ADMIN — PROMETHAZINE HYDROCHLORIDE 25 MG: 25 INJECTION INTRAMUSCULAR; INTRAVENOUS at 00:57

## 2021-12-15 RX ADMIN — HALOPERIDOL LACTATE: 5 INJECTION, SOLUTION INTRAMUSCULAR at 01:52

## 2021-12-15 NOTE — ED NOTES
0115: Pt is resting quietly. No complaints at this time. 0135: Pt states she feels nauseous. MD aware. Orders placed. 0230: Pt resting.

## 2021-12-15 NOTE — DISCHARGE INSTRUCTIONS
It was a pleasure taking care of you in our Emergency Department today. We know that when you come to [facility], you are entrusting us with your health, comfort, and safety. Our physicians and nurses honor that trust, and truly appreciate the opportunity to care for you and your loved ones. We also value your feedback. If you receive a survey about your Emergency Department experience today, please fill it out. We care about our patients' feedback, and we listen to what you have to say. Thank you!       Dr. Beatriz Figueroa MD.

## 2021-12-15 NOTE — ED NOTES
Pt presents to the ED for dehydration. Pt states she hasn't been able to keep anything down. Pt states she was here last night for the same complaint, but states her symptoms got worse. Pt was told by surgeon to come to the ED to be admitted for her symptoms.

## 2021-12-15 NOTE — ED PROVIDER NOTES
EMERGENCY DEPARTMENT HISTORY AND PHYSICAL EXAM     ------------------------------------------------------------------------------------------------------  Please note that this dictation was completed with [employer], the [employer] voice recognition software. Quite often unanticipated grammatical, syntax, homophones, and other interpretive errors are inadvertently transcribed by the computer software. Please disregard these errors. Please excuse any errors that have escaped final proofreading.  -----------------------------------------------------------------------------------------------------------------    Date: 12/14/2021  Patient Name: Ludivina Terry    History of Presenting Illness     Chief Complaint   Patient presents with    Dehydration     PT arrives ambulatory to triage with CC of N/V starting 4 days ago. [place] has recent lipo-suction 3 weeks ago initially thought her symtpoms were related to that. Saw provider associated with procedure today who sent her back to ED \"for admission\" related to dehydration       History Provided By: Patient    HPI: Ludivina Terry is a 54 y.o. female, with significant pmhx of hypertension, hiatal hernia, GERD, cholesterol, who presents via private vehicle to the ED with c/o nausea, vomiting is been ongoing for several days. Patient reports having abdominoplasty and gastric sleeve surgery 3 weeks ago. Was seen yesterday emergency department with extensive work-up including a CT scan that showed only seroma and dehydration. Patient was provided with IV fluids and subsequently discharged. Was told to follow-up with her surgeon. Notes that she is talked to/saw the surgeon earlier today who felt that her symptoms were not related to the seroma or anything having to do with his surgery. Patient states that the surgeon told her to come \"back to the emergency department to be admitted. \"  Patient notes that the surgeon informed her that she had spoken with someone here in the \[facility] [place] [facility]\"" about this. There are no notes to indicate a message that was left with any of the physicians that are currently on staff. Patient reports that she has not been able to eat anything today because of the vomiting. No fever today. Pt also specifically denies any recent fevers, chills, CP, SOB,  urinary sxs, or headache. PCP: Shannan Drake NP    Social Hx: denies tobacco, denies EtOH, denies recreational/ Illicit Drugs     There are no other complaints, changes, or physical findings at this time. No Known Allergies      Current Outpatient Medications   Medication Sig Dispense Refill    hyoscyamine SL (LEVSIN/SL) 0.125 mg SL tablet 2 Tablets by SubLINGual route every four (4) hours as needed for Cramping. 20 Tablet 0    promethazine (PHENERGAN) 25 mg suppository Insert 1 Suppository into rectum every six (6) hours as needed for Nausea for up to 7 days. 12 Suppository 0    doxycycline (VIBRA-TABS) 100 mg tablet Take 1 Tablet by mouth two (2) times a day for 10 days. 20 Tablet 0    ibuprofen (MOTRIN) 800 mg tablet Take 1 Tablet by mouth every eight (8) hours as needed for Pain. 24 Tablet 0    levothyroxine (SYNTHROID) 50 mcg tablet TAKE 1 TAB BY MOUTH DAILY (BEFORE BREAKFAST) 90 Tab 1    ibuprofen (MOTRIN) 800 mg tablet Take 1 Tab by mouth every six (6) hours as needed for Pain. 60 Tab 0    sucralfate (CARAFATE) 100 mg/mL suspension Take  by mouth four (4) times daily.  calcium carbonate/vitamin D3 (CALCIUM 500 + D PO) Take  by mouth.  docusate sodium (Colace) 100 mg capsule Take 100 mg by mouth two (2) times a day.  cyclobenzaprine (FLEXERIL) 10 mg tablet Take  by mouth three (3) times daily as needed for Muscle Spasm(s).  fluconazole (DIFLUCAN) 150 mg tablet Take 150 mg by mouth daily. FDA advises cautious prescribing of oral fluconazole in pregnancy.  ketorolac (TORADOL) 10 mg tablet Take  by mouth.  meloxicam (MOBIC) 7.5 mg tablet Take  by mouth daily.       ondansetron hcl (ZOFRAN) 8 mg tablet Take 8 mg by mouth every eight (8) hours as needed for Nausea or Vomiting.  oseltamivir (TAMIFLU) 75 mg capsule Take  by mouth.  traMADoL (ULTRAM) 50 mg tablet Take 50 mg by mouth every six (6) hours as needed for Pain.  ursodioL (ACTIGALL) 300 mg capsule Take 300 mg by mouth two (2) times a day.  albuterol (Ventolin HFA) 90 mcg/actuation inhaler Take  by inhalation.  norethindrone-ethinyl estradiol (Junel FE , ,) 1 mg-20 mcg (21)/75 mg (7) tab Take  by mouth.  polyethylene glycol (MIRALAX) 17 gram packet Take 1 packet (17 Gm) per label directions 2 x day until BMs normalize then cut back dosing as needed to maintain at least every other day BMs. Stay well hydrated. 60 Packet 5    multivitamin (ONE A DAY) tablet Take 1 Tab by mouth daily.  omega 3-dha-epa-fish oil 100-160-1,000 mg cap Take  by mouth.  Biotin 2,500 mcg cap Take  by mouth. Past History     Past Medical History:  Past Medical History:   Diagnosis Date    GERD (gastroesophageal reflux disease)     Hiatal hernia     Hypercholesterolemia     Hypertension     Thyroid disease        Past Surgical History:  Past Surgical History:   Procedure Laterality Date    HX CARPAL TUNNEL RELEASE      HX  SECTION      HX GI      gastric sleeves    MD LAP,TUBAL CAUTERY         Family History:  Family History   Problem Relation Age of Onset    Heart Disease Father        Social History:  Social History     Tobacco Use    Smoking status: Never Smoker    Smokeless tobacco: Never Used   Substance Use Topics    Alcohol use: Not Currently    Drug use: Never       Allergies:  No Known Allergies      Review of Systems   Review of Systems   Constitutional: Negative. Negative for fever. Eyes: Negative. Respiratory: Negative. Negative for shortness of breath. Cardiovascular: Negative for chest pain. Gastrointestinal: Positive for nausea and vomiting. Negative for abdominal pain. Endocrine: Negative. Genitourinary: Negative. Negative for difficulty urinating, dysuria and hematuria. Musculoskeletal: Negative. Skin: Negative. Neurological: Negative. Psychiatric/Behavioral: Negative for suicidal ideas. All other systems reviewed and are negative. Physical Exam   Physical Exam  Vitals and nursing note reviewed. Constitutional:       General: She is not in acute distress. Appearance: She is well-developed. She is not diaphoretic. HENT:      Head: Normocephalic and atraumatic. Nose: Nose normal.   Eyes:      General: No scleral icterus. Conjunctiva/sclera: Conjunctivae normal.   Neck:      Trachea: No tracheal deviation. Cardiovascular:      Rate and Rhythm: Normal rate and regular rhythm. Heart sounds: Normal heart sounds. No murmur heard. No friction rub. Pulmonary:      Effort: Pulmonary effort is normal. No respiratory distress. Breath sounds: Normal breath sounds. No stridor. No wheezing or rales. Abdominal:      General: Bowel sounds are normal. There is no distension. Palpations: Abdomen is soft. Tenderness: There is no abdominal tenderness. There is no rebound. Musculoskeletal:         General: No tenderness. Normal range of motion. Cervical back: Normal range of motion. Skin:     General: Skin is warm and dry. Findings: No rash. Neurological:      Mental Status: She is alert and oriented to person, place, and time. Cranial Nerves: No cranial nerve deficit. Psychiatric:         Speech: Speech normal.         Behavior: Behavior normal.         Thought Content:  Thought content normal.         Judgment: Judgment normal.           Diagnostic Study Results     Labs -     Recent Results (from the past 12 hour(s))   URINALYSIS W/ REFLEX CULTURE    Collection Time: 12/14/21  7:39 PM    Specimen: Miscellaneous sample; Urine    Urine specimen   Result Value Ref Range    Color YELLOW/STRAW      Appearance CLEAR CLEAR      Specific gravity 1.020 1.003 - 1.030      pH (UA) 6.0 5.0 - 8.0      Protein Negative NEG mg/dL    Glucose Negative NEG mg/dL    Ketone 80 (A) NEG mg/dL    Blood Negative NEG      Urobilinogen 1.0 0.2 - 1.0 EU/dL    Nitrites Negative NEG      Leukocyte Esterase Negative NEG      WBC 0-4 0 - 4 /hpf    RBC 0-5 0 - 5 /hpf    Epithelial cells FEW FEW /lpf    Bacteria Negative NEG /hpf    UA:UC IF INDICATED CULTURE NOT INDICATED BY UA RESULT CNI      Hyaline cast 2-5 0 - 5 /lpf   CBC WITH AUTOMATED DIFF    Collection Time: 12/14/21  7:39 PM   Result Value Ref Range    WBC 13.7 (H) 3.6 - 11.0 K/uL    RBC 4.46 3.80 - 5.20 M/uL    HGB 13.0 11.5 - 16.0 g/dL    HCT 39.3 35.0 - 47.0 %    MCV 88.1 80.0 - 99.0 FL    MCH 29.1 26.0 - 34.0 PG    MCHC 33.1 30.0 - 36.5 g/dL    RDW 12.0 11.5 - 14.5 %    PLATELET 326 (H) 974 - 400 K/uL    MPV 9.5 8.9 - 12.9 FL    NRBC 0.0 0  WBC    ABSOLUTE NRBC 0.00 0.00 - 0.01 K/uL    NEUTROPHILS 68 32 - 75 %    LYMPHOCYTES 24 12 - 49 %    MONOCYTES 5 5 - 13 %    EOSINOPHILS 2 0 - 7 %    BASOPHILS 1 0 - 1 %    IMMATURE GRANULOCYTES 0 0.0 - 0.5 %    ABS. NEUTROPHILS 9.3 (H) 1.8 - 8.0 K/UL    ABS. LYMPHOCYTES 3.3 0.8 - 3.5 K/UL    ABS. MONOCYTES 0.7 0.0 - 1.0 K/UL    ABS. EOSINOPHILS 0.2 0.0 - 0.4 K/UL    ABS. BASOPHILS 0.1 0.0 - 0.1 K/UL    ABS. IMM.  GRANS. 0.1 (H) 0.00 - 0.04 K/UL    DF AUTOMATED     METABOLIC PANEL, COMPREHENSIVE    Collection Time: 12/14/21  7:39 PM   Result Value Ref Range    Sodium 138 136 - 145 mmol/L    Potassium 4.2 3.5 - 5.1 mmol/L    Chloride 102 97 - 108 mmol/L    CO2 27 21 - 32 mmol/L    Anion gap 9 5 - 15 mmol/L    Glucose 87 65 - 100 mg/dL    BUN 9 6 - 20 MG/DL    Creatinine 0.92 0.55 - 1.02 MG/DL    BUN/Creatinine ratio 10 (L) 12 - 20      GFR est AA >60 >60 ml/min/1.73m2    GFR est non-AA >60 >60 ml/min/1.73m2    Calcium 9.8 8.5 - 10.1 MG/DL    Bilirubin, total 0.4 0.2 - 1.0 MG/DL    ALT (SGPT) 28 12 - 78 U/L    AST (SGOT) 23 15 - 37 U/L    Alk. phosphatase 121 (H) 45 - 117 U/L    Protein, total 8.6 (H) 6.4 - 8.2 g/dL    Albumin 3.4 (L) 3.5 - 5.0 g/dL    Globulin 5.2 (H) 2.0 - 4.0 g/dL    A-G Ratio 0.7 (L) 1.1 - 2.2     BILIRUBIN, CONFIRM    Collection Time: 12/14/21  7:39 PM   Result Value Ref Range    Bilirubin UA, confirm Negative NEG     POC LACTIC ACID    Collection Time: 12/14/21 11:21 PM   Result Value Ref Range    Lactic Acid (POC) 0.62 0.40 - 2.00 mmol/L       Radiologic Studies -   No orders to display     CT Results  (Last 48 hours)               12/13/21 0615  CT ABD PELV W CONT Final result    Impression:      1. No significant intra-abdominal finding status post abdominoplasty and gastric   sleeve surgery. 2. There is stranding in the anterior abdominal wall and there are 2 separate   collections on the right. Correlate clinically for signs of infection. Narrative:  EXAM: CT ABD PELV W CONT       INDICATION: Pain in the abdomen, 3 weeks post-op abdomnioplasty, nauseated, hx   of gastric sleeve       COMPARISON: None        CONTRAST: 100 mL of Isovue-370. TECHNIQUE:    Following the uneventful intravenous administration of contrast, thin axial   images were obtained through the abdomen and pelvis. Coronal and sagittal   reconstructions were generated. Oral contrast was administered. CT dose   reduction was achieved through use of a standardized protocol tailored for this   examination and automatic exposure control for dose modulation. FINDINGS:    LOWER THORAX: No significant abnormality in the incidentally imaged lower chest.   LIVER: No mass. BILIARY TREE: Gallbladder is within normal limits. CBD is not dilated. SPLEEN: within normal limits. PANCREAS: No mass or ductal dilatation. ADRENALS: Unremarkable. KIDNEYS: No mass, calculus, or hydronephrosis. STOMACH: Gastric sleeve. SMALL BOWEL: No dilatation or wall thickening. COLON: No dilatation or wall thickening.    APPENDIX: Normal.   PERITONEUM: No ascites or pneumoperitoneum. RETROPERITONEUM: No lymphadenopathy or aortic aneurysm. REPRODUCTIVE ORGANS: Normal.   URINARY BLADDER: No mass or calculus. BONES: No destructive bone lesion. ABDOMINAL WALL: Stranding in the anterior abdominal wall. 3.4 x 1.9  x 0.9 cm   collection right abdominal wall. Second collection right lateral abdominal wall   measures approximately 6.8 x 1.4 x 1.9  cm. ADDITIONAL COMMENTS: N/A               CXR Results  (Last 48 hours)    None            Medical Decision Making   I am the first provider for this patient. I reviewed the vital signs, available nursing notes, past medical history, past surgical history, family history and social history. Vital Signs-Reviewed the patient's vital signs. Patient Vitals for the past 12 hrs:   Temp Pulse Resp BP SpO2   12/15/21 0235  91 18 130/74 100 %   12/15/21 0124  84 18 134/81 99 %   12/15/21 0013  71 16 133/78 100 %   12/14/21 2249  77 16 (!) 141/89 100 %   12/14/21 1853 98.2 °F (36.8 °C) 74 16 131/79 98 %       Pulse Oximetry Analysis - 100% on RA Normal    Records Reviewed/Interpretted: Nursing Notes from triage and Old Medical Records, noting earlier visit yesterday with ED noting negative CT scan other than seromas    Provider Notes (Medical Decision Making):     DDX:  Postoperative GI changes, enteritis, dehydration, UTI, electrolyte derangement, infected seroma    Plan:  Labs, lactate, blood cultures, antiemetic, IV fluids, UA    Impression:  Nausea, vomiting, abdominal cramping, dehydration    ED Course:   Initial assessment performed. The patients presenting problems have been discussed, and they are in agreement with the care plan formulated and outlined with them. I have encouraged them to ask questions as they arise throughout their visit.     I reviewed our electronic medical record system for any past medical records that were available that may contribute to the patients current condition, the nursing notes and and vital signs from today's visit  Nursing notes will be reviewed as they become available in realtime while the pt has been in the ED. Wyatt Mccoy MD      I personally reviewed/interpreted pt's imaging. Agree with official read by radiology as noted above. Wyatt Mccoy MD    PROGRESS NOTE  12:45 AM  Patient noted to initially feel better with medication provided. While given water to drink patient noted to have dry heaves but no actual vomiting.    3:17 AM  Progress note:  Pt noted to be feeling better, ready for discharge. Discussed lab findings with pt, specifically noting normal white blood cell count and lactate. Pt will follow up with primary care, plastic surgeon as instructed. All questions have been answered, pt voiced understanding and agreement with plan. Specific return precautions provided in addition to instructions for pt to return to the ED immediately should sx worsen at any time. Wyatt Mccoy MD             Critical Care Time:     none      Diagnosis     Clinical Impression:   1. Malaise and fatigue    2. Non-intractable vomiting with nausea, unspecified vomiting type    3. Abdominal wall seroma, initial encounter    4. Abdominal cramping        PLAN:  1. Current Discharge Medication List      START taking these medications    Details   hyoscyamine SL (LEVSIN/SL) 0.125 mg SL tablet 2 Tablets by SubLINGual route every four (4) hours as needed for Cramping. Qty: 20 Tablet, Refills: 0  Start date: 12/15/2021      promethazine (PHENERGAN) 25 mg suppository Insert 1 Suppository into rectum every six (6) hours as needed for Nausea for up to 7 days. Qty: 12 Suppository, Refills: 0  Start date: 12/14/2021, End date: 12/21/2021           2.    Follow-up Information     Follow up With Specialties Details Why Contact Info    Olamide Shah NP Nurse Practitioner Schedule an appointment as soon as possible for a visit in 2 days  [phone]  [phone]      Your surgeon   As needed     [facility] EMERGENCY DEPT Emergency Medicine  As needed, If symptoms worsen [address]  [address] [facility]  [phone]        Return to ED if worse     Disposition:    3:17 AM   The patient's results have been reviewed with family and/or caregiver. They verbally convey their understanding and agreement of the patient's signs, symptoms, diagnosis, treatment and prognosis and additionally agree to follow up as recommended in the discharge instructions or to return to the Emergency Room should the patient's condition change prior to their follow-up appointment. The family and/or caregiver verbally agrees with the care-plan and all of their questions have been answered. The discharge instructions have also been provided to the them with educational information regarding the patient's diagnosis as well a list of reasons why the patient would want to return to the ER prior to their follow-up appointment should their condition change.   Donta Wood MD

## 2021-12-20 LAB
BACTERIA SPEC CULT: NORMAL
SERVICE CMNT-IMP: NORMAL

## 2022-03-19 PROBLEM — K59.00 CONSTIPATION: Status: ACTIVE | Noted: 2020-10-22

## 2022-03-19 PROBLEM — E78.00 HYPERCHOLESTEREMIA: Status: ACTIVE | Noted: 2020-08-31

## 2022-03-19 PROBLEM — Z98.84 STATUS POST BARIATRIC SURGERY: Status: ACTIVE | Noted: 2020-08-31

## 2022-03-19 PROBLEM — I10 HYPERTENSION: Status: ACTIVE | Noted: 2020-08-31

## 2022-03-19 PROBLEM — R30.0 DYSURIA: Status: ACTIVE | Noted: 2020-10-22

## 2022-03-19 PROBLEM — N76.0 VAGINITIS: Status: ACTIVE | Noted: 2020-10-22

## 2022-03-19 PROBLEM — E66.01 OBESITY, MORBID (HCC): Status: ACTIVE | Noted: 2020-09-21

## 2022-03-20 PROBLEM — K21.9 GASTROESOPHAGEAL REFLUX DISEASE WITHOUT ESOPHAGITIS: Status: ACTIVE | Noted: 2020-10-12

## 2022-04-16 ENCOUNTER — HOSPITAL ENCOUNTER (EMERGENCY)
Age: 52
Discharge: HOME | End: 2022-04-16
Attending: EMERGENCY MEDICINE
Payer: COMMERCIAL

## 2022-04-16 VITALS
TEMPERATURE: 98.1 F | WEIGHT: 211 LBS | DIASTOLIC BLOOD PRESSURE: 85 MMHG | RESPIRATION RATE: 16 BRPM | BODY MASS INDEX: 39.84 KG/M2 | SYSTOLIC BLOOD PRESSURE: 129 MMHG | OXYGEN SATURATION: 99 % | HEIGHT: 61 IN | HEART RATE: 65 BPM

## 2022-04-16 DIAGNOSIS — S16.1XXA STRAIN OF NECK MUSCLE, INITIAL ENCOUNTER: ICD-10-CM
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
## 2022-04-16 PROCEDURE — 99284 EMERGENCY DEPT VISIT MOD MDM: CPT

## 2022-04-16 PROCEDURE — 74011250637 HC RX REV CODE- 250/637: Performed by: EMERGENCY MEDICINE

## 2022-04-16 PROCEDURE — 74011250636 HC RX REV CODE- 250/636: Performed by: EMERGENCY MEDICINE

## 2022-04-16 PROCEDURE — 96372 THER/PROPH/DIAG INJ SC/IM: CPT

## 2022-04-16 RX ORDER — KETOROLAC TROMETHAMINE 30 MG/ML
30 INJECTION, SOLUTION INTRAMUSCULAR; INTRAVENOUS
Status: COMPLETED | OUTPATIENT
Start: 2022-04-16 | End: 2022-04-16

## 2022-04-16 RX ORDER — IBUPROFEN 600 MG/1
600 TABLET ORAL
Qty: 20 TABLET | Refills: 0 | Status: SHIPPED | OUTPATIENT
Start: 2022-04-16

## 2022-04-16 RX ORDER — DIAZEPAM 5 MG/1
5 TABLET ORAL
Status: COMPLETED | OUTPATIENT
Start: 2022-04-16 | End: 2022-04-16

## 2022-04-16 RX ORDER — DIAZEPAM 5 MG/1
5 TABLET ORAL
Qty: 10 TABLET | Refills: 0 | Status: SHIPPED | OUTPATIENT
Start: 2022-04-16 | End: 2022-04-19

## 2022-04-16 RX ORDER — OMEPRAZOLE 20 MG/1
CAPSULE, DELAYED RELEASE ORAL
COMMUNITY
Start: 2022-03-26

## 2022-04-16 RX ADMIN — DIAZEPAM 5 MG: 5 TABLET ORAL at 19:36

## 2022-04-16 RX ADMIN — KETOROLAC TROMETHAMINE 30 MG: 30 INJECTION, SOLUTION INTRAMUSCULAR at 19:36
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
## 2022-04-21 ENCOUNTER — OFFICE VISIT (OUTPATIENT)
Dept: PRIMARY CARE CLINIC | Age: 52
End: 2022-04-21

## 2022-04-21 VITALS
RESPIRATION RATE: 20 BRPM | SYSTOLIC BLOOD PRESSURE: 119 MMHG | TEMPERATURE: 97.4 F | WEIGHT: 215.6 LBS | BODY MASS INDEX: 40.7 KG/M2 | HEART RATE: 64 BPM | HEIGHT: 61 IN | OXYGEN SATURATION: 98 % | DIASTOLIC BLOOD PRESSURE: 80 MMHG

## 2022-04-21 DIAGNOSIS — Z87.828 HISTORY OF MOTOR VEHICLE ACCIDENT: Primary | ICD-10-CM

## 2022-04-21 DIAGNOSIS — S29.019A: ICD-10-CM

## 2022-04-21 DIAGNOSIS — S16.1XXD STRAIN OF NECK MUSCLE, SUBSEQUENT ENCOUNTER: ICD-10-CM

## 2022-04-21 PROCEDURE — 99214 OFFICE O/P EST MOD 30 MIN: CPT | Performed by: FAMILY MEDICINE

## 2022-04-21 RX ORDER — SUCRALFATE 1 G/1
TABLET ORAL
COMMUNITY
Start: 2022-03-26

## 2022-04-21 RX ORDER — METHOCARBAMOL 750 MG/1
750 TABLET, FILM COATED ORAL
Qty: 40 TABLET | Refills: 0 | Status: SHIPPED | OUTPATIENT
Start: 2022-04-21
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
## 2022-04-21 NOTE — LETTER
4/21/2022 11:18 AM    [place]. [place] [id number]      To Whom It May Concern,    Patient was seen by me in office today for follow up.         Sincerely,      Alfredo Zhang MD

## 2022-04-21 NOTE — PROGRESS NOTES
1. \"Have you been to the ER, urgent care clinic since your last visit? Hospitalized since your last visit? \" No    2. \"Have you seen or consulted any other health care providers outside of the [address] [place] [address] since your last visit? \" No     3. For patients aged 39-70: Has the patient had a colonoscopy / FIT/ Cologuard? No      If the patient is female:    4. For patients aged 41-77: Has the patient had a mammogram within the past 2 years? Yes - Care Gap present. Most recent result on file      5. For patients aged 21-65: Has the patient had a pap smear? Yes - Care Gap present. Most recent result on file  Visit Vitals  /80 (BP 1 Location: Left upper arm, BP Patient Position: Sitting, BP Cuff Size: Large adult)   Pulse 64   Temp 97.4 °F (36.3 °C) (Temporal)   Resp 20   Ht 5' 1\" (1.549 m)   Wt 215 lb 9.6 oz (97.8 kg)   SpO2 98%   BMI 40.74 kg/m²     Chief Complaint   Patient presents with    Follow-up     ER visit 04/16/2022. Back and neck pain.

## 2022-05-10 ENCOUNTER — HOSPITAL ENCOUNTER (OUTPATIENT)
Dept: PHYSICAL THERAPY | Age: 52
Discharge: HOME | End: 2022-05-10
Payer: COMMERCIAL

## 2022-05-10 PROCEDURE — 97161 PT EVAL LOW COMPLEX 20 MIN: CPT

## 2022-05-10 PROCEDURE — 97110 THERAPEUTIC EXERCISES: CPT
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
## 2022-05-24 ENCOUNTER — HOSPITAL ENCOUNTER (OUTPATIENT)
Dept: PHYSICAL THERAPY | Age: 52
Discharge: HOME | End: 2022-05-24
Payer: COMMERCIAL

## 2022-05-24 PROCEDURE — 97140 MANUAL THERAPY 1/> REGIONS: CPT

## 2022-05-24 PROCEDURE — 97110 THERAPEUTIC EXERCISES: CPT
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
## 2022-06-01 ENCOUNTER — HOSPITAL ENCOUNTER (OUTPATIENT)
Dept: PHYSICAL THERAPY | Age: 52
End: 2022-06-01
Payer: COMMERCIAL

## 2022-06-03 ENCOUNTER — APPOINTMENT (OUTPATIENT)
Dept: PHYSICAL THERAPY | Age: 52
End: 2022-06-03
Payer: COMMERCIAL

## 2022-06-07 ENCOUNTER — HOSPITAL ENCOUNTER (OUTPATIENT)
Dept: PHYSICAL THERAPY | Age: 52
Discharge: HOME | End: 2022-06-07
Payer: COMMERCIAL

## 2022-06-07 PROCEDURE — 97110 THERAPEUTIC EXERCISES: CPT

## 2022-06-07 PROCEDURE — 97014 ELECTRIC STIMULATION THERAPY: CPT
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
## 2022-06-09 ENCOUNTER — HOSPITAL ENCOUNTER (OUTPATIENT)
Dept: PHYSICAL THERAPY | Age: 52
Discharge: HOME | End: 2022-06-09
Payer: COMMERCIAL

## 2022-06-09 PROCEDURE — 97110 THERAPEUTIC EXERCISES: CPT

## 2022-06-09 PROCEDURE — 97014 ELECTRIC STIMULATION THERAPY: CPT
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
## 2022-06-14 ENCOUNTER — HOSPITAL ENCOUNTER (OUTPATIENT)
Dept: PHYSICAL THERAPY | Age: 52
Discharge: HOME | End: 2022-06-14
Payer: COMMERCIAL

## 2022-06-14 PROCEDURE — 97014 ELECTRIC STIMULATION THERAPY: CPT

## 2022-06-14 PROCEDURE — 97112 NEUROMUSCULAR REEDUCATION: CPT

## 2022-06-14 PROCEDURE — 97110 THERAPEUTIC EXERCISES: CPT
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
## 2022-06-16 ENCOUNTER — HOSPITAL ENCOUNTER (OUTPATIENT)
Dept: PHYSICAL THERAPY | Age: 52
Discharge: HOME | End: 2022-06-16
Payer: COMMERCIAL

## 2022-06-16 PROCEDURE — 97110 THERAPEUTIC EXERCISES: CPT

## 2022-06-16 PROCEDURE — 97016 VASOPNEUMATIC DEVICE THERAPY: CPT

## 2022-06-16 PROCEDURE — 97112 NEUROMUSCULAR REEDUCATION: CPT

## 2022-06-16 NOTE — PROGRESS NOTES
PT DAILY TREATMENT NOTE - [facility] 2-15    Patient Name: Niranjan Bud  Date:2022  : 1970  [x]  Patient  Verified  Payor: Yashiraa Kanner / Plan: Aide Robles [id number] PPO / Product Type: PPO /    In time: 4:00P  Out time: 5:07P  Total Treatment Time (min): 67  Total Timed Codes (min): 52  1:1 Treatment Time ( only): 52  Visit #:  6    Treatment Area: Personal history of other (healed) physical injury and trauma [Z87.828]  Strain of muscle, fascia and tendon at neck level, subsequent encounter [S16. 1XXD]  Strain of muscle and tendon of unspecified wall of thorax, initial encounter [S29.019A]    SUBJECTIVE  Pain Level (0-10 scale): 1/10  Any medication changes, allergies to medications, adverse drug reactions, diagnosis change, or new procedure performed?: [x] No    [] Yes (see summary sheet for update)  Subjective functional status/changes:   [] No changes reported  Pt reported feeling a little sore after her last session. But feels good today.      OBJECTIVE    Modality rationale: decrease pain and increase tissue extensibility to improve the patients ability to ambulate with decreased pain   Min Type Additional Details      15 [x] Estim: []Att   [x]Unatt    []TENS instruct                  [x]IFC  []Premod   []NMES                     []Other:  []w/US   []w/ice   [x]w/heat  Position: seated  Location: UT/LS and heat on lumbar        []  Traction: [] Cervical       []Lumbar                       [] Prone          []Supine                       []Intermittent   []Continuous Lbs:  [] before manual  [] after manual  []w/heat    []  Ultrasound: []Continuous   [] Pulsed                       at: []1MHz   []3MHz Location:  W/cm2:    [] Paraffin         Location:   []w/heat    []  Ice     []  Heat  []  Ice massage Position:  Location:    []  Laser  []  Other: Position:  Location:      []  Vasopneumatic Device Pressure:       [] lo [] med [] hi   Temperature:      [x] Skin assessment post-treatment: [x]intact []redness- no adverse reaction    []redness - adverse reaction:     32 min Therapeutic Exercise:  [x] See flow sheet :   Rationale: increase ROM and increase strength to improve the patients ability to work with decreased pain     15 min Neuromuscular Reeducation:  [x] See flow sheet : glut squeezes, bridges, core press tband shoulder extension with TA set    Rationale: increase ROM and increase strength to improve the patients ability to work with decreased pain         With   [] TE   [] TA   [] Neuro   [] SC   [] other: Patient Education: [x] Review HEP    [] Progressed/Changed HEP based on:   [] positioning   [] body mechanics   [] transfers   [] heat/ice application    [] other:      Other Objective/Functional Measures: --     Pain Level (0-10 scale) post treatment: 0/10    ASSESSMENT/Changes in Function:   Pt tolerated added stability work well. Citing no pain just challenging. Required min verbal cuing on shoulder shrug compensation with shoulder extension with theraband today. Pt will FU with PT next session for assessment and plan development of care beyond next week. Patient will continue to benefit from skilled PT services to modify and progress therapeutic interventions, address functional mobility deficits, address ROM deficits, address strength deficits, analyze and address soft tissue restrictions, analyze and cue movement patterns, analyze and modify body mechanics/ergonomics and assess and modify postural abnormalities to attain remaining goals.      [x]  See Plan of Care  []  See progress note/recertification  []  See Discharge Summary         Progress towards goals / Updated goals:  Short Term Goals: To be accomplished in 3-4 treatments:  1) Pt will be independent with HEP  2) Pt will demonstrate understanding/application of postural principles without aggravation symptoms  3) Pt will report >/=25% decrease in symptoms  4) Pt will report understanding/application of workstation set up recommendations to minimize aggravation of symptoms      Long Term Goals: To be accomplished in 8-10 treatments:  1) Pt will report >/=75% decrease in symptoms   2) Pt will be increase cerv AROM rot to >/= 60 degrees bilat so that can look over shoulders while driving without increase of neck pain  3) Pt will report able to sleep through the night without waking in pain  4) Pt will be able to sit greater than 60 minutes without increase pain so that can tolerate normal work duties        PLAN  [x]  Upgrade activities as tolerated     [x]  Continue plan of care  [x]  Update interventions per flow sheet       []  Discharge due to:_  []  Other:_      Tino Zuleta, PTA 6/16/2022

## 2022-06-20 ENCOUNTER — HOSPITAL ENCOUNTER (OUTPATIENT)
Dept: PHYSICAL THERAPY | Age: 52
Discharge: HOME | End: 2022-06-20
Payer: COMMERCIAL

## 2022-06-20 PROCEDURE — 97110 THERAPEUTIC EXERCISES: CPT

## 2022-06-20 NOTE — PROGRESS NOTES
PT DAILY TREATMENT NOTE - [facility] 2-15    Patient Name: Kristy Damian  Date:2022  : 1970  [x]  Patient  Verified  Payor: Cheryl Portillo / Plan: [employer]ide [employer]les [id number] PPO / Product Type: PPO /    In time: 3:57P  Out time: 4:52P  Total Treatment Time (min): 55  Total Timed Codes (min): 55  1:1 Treatment Time ( only): 54   Visit #:  7    Treatment Area: Personal history of other (healed) physical injury and trauma [Z87.828]  Strain of muscle, fascia and tendon at neck level, subsequent encounter [S16. 1XXD]  Strain of muscle and tendon of unspecified wall of thorax, initial encounter [S29.019A]    SUBJECTIVE  Pain Level (0-10 scale): 0/10  Any medication changes, allergies to medications, adverse drug reactions, diagnosis change, or new procedure performed?: [x] No    [] Yes (see summary sheet for update)  Subjective functional status/changes:   [] No changes reported  Those exercises I am doing really seem to help.      OBJECTIVE    55 min Therapeutic Exercise:  [x] See flow sheet :   Rationale: increase ROM and increase strength to improve the patients ability to return to PLOF         With   [] TE   [] TA   [] Neuro   [] SC   [] other: Patient Education: [x] Review HEP    [] Progressed/Changed HEP based on:   [] positioning   [] body mechanics   [] transfers   [] heat/ice application    [] other:      Other Objective/Functional Measures:     FOTO score (Neck): 62/100   FOTO score (Thoracic): 68/100     Cervical AROM:  Rotation R 65 L 62     Pain Level (0-10 scale) post treatment: 0/10    ASSESSMENT/Changes in Function:      []  See Plan of Care  [x]  See progress note/recertification  []  See Discharge Summary         PLAN  [x]  Upgrade activities as tolerated     [x]  Continue plan of care  [x]  Update interventions per flow sheet       []  Discharge due to:_  []  Other:_      Colin Roldan, PT 2022

## 2022-06-20 NOTE — THERAPY DISCHARGE
Physical Therapy at [facility],   a part of [facility], [address] [place] [address]  Phone: [phone]  Fax: [phone]    Discharge Summary  2-15    Patient name: Anselmo Mg  : 1970  Provider#: [id number]  Referral source: Aiden Hansen MD      Medical/Treatment Diagnosis: Personal history of other (healed) physical injury and trauma [Z87.828]  Strain of muscle, fascia and tendon at neck level, subsequent encounter [S16. 1XXD]  Strain of muscle and tendon of unspecified wall of thorax, initial encounter [S29.019A]     Prior Hospitalization: see medical history     Comorbidities:  gastric sleeve surgery, gastric ulcers, hx of R shldr dislocation   Prior Level of Function:able to turn head, reach overhead, vinny prol sitting, sleep through night w/out pain/limitation   Medications: Verified on Patient Summary List    Start of Care: 05/10/22      Onset Date:2022    Visits from Start of Care: 7     Missed Visits: 2  Reporting Period : 05/10/22 to 22    Goal:  Short Term Goals: To be accomplished in 3-4 treatments:  1) Pt will be independent with HEP MET  2) Pt will demonstrate understanding/application of postural principles without aggravation symptoms MET  3) Pt will report >/=25% decrease in symptoms MET  4) Pt will report understanding/application of workstation set up recommendations to minimize aggravation of symptoms MET     Long Term Goals: To be accomplished in 8-10 treatments:  1) Pt will report >/=75% decrease in symptoms MET  2) Pt will be increase cerv AROM rot to >/= 60 degrees bilat so that can look over shoulders while driving without increase of neck pain MET  3) Pt will report able to sleep through the night without waking in pain MET  4) Pt will be able to sit greater than 60 minutes without increase pain so that can tolerate normal work duties MET         ASSESSMENT/SUMMARY OF CARE: Overall, patient has made significant improvement with skilled physical therapy. She demonstrates significant improvement in FOTO score compared to initial evaluation and reports being 90% recovered. She verbalizes returning to walking as well as yardwork without pain and reports management of any residual symptoms with her current HEP. At this time, she has met all  of her goals and will be discharged to her progressive HEP.      RECOMMENDATIONS:  [x]Discontinue therapy: [x]Patient has reached or is progressing toward set goals      []Patient is non-compliant or has abdicated      []Due to lack of appreciable progress towards set goals    Vanna Dick, PT 6/20/2022

## 2022-06-22 ENCOUNTER — APPOINTMENT (OUTPATIENT)
Dept: PHYSICAL THERAPY | Age: 52
End: 2022-06-22
Payer: COMMERCIAL

## 2023-05-16 RX ORDER — SUCRALFATE 1 G/1
TABLET ORAL
COMMUNITY
Start: 2022-03-26

## 2023-05-16 RX ORDER — IBUPROFEN 600 MG/1
600 TABLET ORAL EVERY 6 HOURS PRN
COMMUNITY
Start: 2022-04-16 | End: 2023-07-03

## 2023-05-16 RX ORDER — OMEPRAZOLE 20 MG/1
CAPSULE, DELAYED RELEASE ORAL
COMMUNITY
Start: 2022-03-26

## 2023-05-16 RX ORDER — METHOCARBAMOL 750 MG/1
750 TABLET, FILM COATED ORAL 4 TIMES DAILY PRN
COMMUNITY
Start: 2022-04-21 | End: 2023-07-03

## 2023-07-03 ENCOUNTER — APPOINTMENT (OUTPATIENT)
Facility: HOSPITAL | Age: 53
End: 2023-07-03
Payer: COMMERCIAL

## 2023-07-03 ENCOUNTER — HOSPITAL ENCOUNTER (EMERGENCY)
Facility: HOSPITAL | Age: 53
Discharge: HOME | End: 2023-07-03
Attending: EMERGENCY MEDICINE
Payer: COMMERCIAL

## 2023-07-03 VITALS
RESPIRATION RATE: 14 BRPM | WEIGHT: 231.26 LBS | DIASTOLIC BLOOD PRESSURE: 95 MMHG | BODY MASS INDEX: 43.7 KG/M2 | TEMPERATURE: 98.1 F | HEART RATE: 60 BPM | SYSTOLIC BLOOD PRESSURE: 138 MMHG | OXYGEN SATURATION: 100 %

## 2023-07-03 DIAGNOSIS — R10.9 ACUTE RIGHT FLANK PAIN: Primary | ICD-10-CM

## 2023-07-03 LAB
ALBUMIN SERPL-MCNC: 3.8 G/DL (ref 3.5–5)
ALBUMIN/GLOB SERPL: 0.8 (ref 1.1–2.2)
ALP SERPL-CCNC: 100 U/L (ref 45–117)
ALT SERPL-CCNC: 28 U/L (ref 12–78)
ANION GAP SERPL CALC-SCNC: 4 MMOL/L (ref 5–15)
APPEARANCE UR: CLEAR
AST SERPL-CCNC: 26 U/L (ref 15–37)
BACTERIA URNS QL MICRO: NEGATIVE /HPF
BASOPHILS # BLD: 0.1 K/UL (ref 0–0.1)
BASOPHILS NFR BLD: 1 % (ref 0–1)
BILIRUB SERPL-MCNC: 0.4 MG/DL (ref 0.2–1)
BILIRUB UR QL: NEGATIVE
BUN SERPL-MCNC: 11 MG/DL (ref 6–20)
BUN/CREAT SERPL: 12 (ref 12–20)
CALCIUM SERPL-MCNC: 8.9 MG/DL (ref 8.5–10.1)
CHLORIDE SERPL-SCNC: 105 MMOL/L (ref 97–108)
CO2 SERPL-SCNC: 29 MMOL/L (ref 21–32)
COLOR UR: ABNORMAL
CREAT SERPL-MCNC: 0.91 MG/DL (ref 0.55–1.02)
DIFFERENTIAL METHOD BLD: NORMAL
EOSINOPHIL # BLD: 0.2 K/UL (ref 0–0.4)
EOSINOPHIL NFR BLD: 3 % (ref 0–7)
EPITH CASTS URNS QL MICRO: ABNORMAL /LPF
ERYTHROCYTE [DISTWIDTH] IN BLOOD BY AUTOMATED COUNT: 13 % (ref 11.5–14.5)
GLOBULIN SER CALC-MCNC: 4.5 G/DL (ref 2–4)
GLUCOSE SERPL-MCNC: 101 MG/DL (ref 65–100)
GLUCOSE UR STRIP.AUTO-MCNC: NEGATIVE MG/DL
HCG UR QL: NEGATIVE
HCT VFR BLD AUTO: 43.6 % (ref 35–47)
HGB BLD-MCNC: 13.8 G/DL (ref 11.5–16)
HGB UR QL STRIP: NEGATIVE
HYALINE CASTS URNS QL MICRO: ABNORMAL /LPF (ref 0–2)
IMM GRANULOCYTES # BLD AUTO: 0 K/UL (ref 0–0.04)
IMM GRANULOCYTES NFR BLD AUTO: 0 % (ref 0–0.5)
KETONES UR QL STRIP.AUTO: NEGATIVE MG/DL
LEUKOCYTE ESTERASE UR QL STRIP.AUTO: NEGATIVE
LIPASE SERPL-CCNC: 151 U/L (ref 73–393)
LYMPHOCYTES # BLD: 2.8 K/UL (ref 0.8–3.5)
LYMPHOCYTES NFR BLD: 38 % (ref 12–49)
MCH RBC QN AUTO: 28.2 PG (ref 26–34)
MCHC RBC AUTO-ENTMCNC: 31.7 G/DL (ref 30–36.5)
MCV RBC AUTO: 89.2 FL (ref 80–99)
MONOCYTES # BLD: 0.3 K/UL (ref 0–1)
MONOCYTES NFR BLD: 5 % (ref 5–13)
NEUTS SEG # BLD: 3.9 K/UL (ref 1.8–8)
NEUTS SEG NFR BLD: 53 % (ref 32–75)
NITRITE UR QL STRIP.AUTO: NEGATIVE
NRBC # BLD: 0 K/UL (ref 0–0.01)
NRBC BLD-RTO: 0 PER 100 WBC
PH UR STRIP: 6 (ref 5–8)
PLATELET # BLD AUTO: 267 K/UL (ref 150–400)
PMV BLD AUTO: 10.6 FL (ref 8.9–12.9)
POTASSIUM SERPL-SCNC: 3.8 MMOL/L (ref 3.5–5.1)
PROT SERPL-MCNC: 8.3 G/DL (ref 6.4–8.2)
PROT UR STRIP-MCNC: NEGATIVE MG/DL
RBC # BLD AUTO: 4.89 M/UL (ref 3.8–5.2)
RBC #/AREA URNS HPF: ABNORMAL /HPF (ref 0–5)
SODIUM SERPL-SCNC: 138 MMOL/L (ref 136–145)
SP GR UR REFRACTOMETRY: 1.01
URINE CULTURE IF INDICATED: ABNORMAL
UROBILINOGEN UR QL STRIP.AUTO: 0.2 EU/DL (ref 0.2–1)
WBC # BLD AUTO: 7.4 K/UL (ref 3.6–11)
WBC URNS QL MICRO: ABNORMAL /HPF (ref 0–4)

## 2023-07-03 PROCEDURE — 96374 THER/PROPH/DIAG INJ IV PUSH: CPT

## 2023-07-03 PROCEDURE — 99284 EMERGENCY DEPT VISIT MOD MDM: CPT

## 2023-07-03 PROCEDURE — 2580000003 HC RX 258: Performed by: EMERGENCY MEDICINE

## 2023-07-03 PROCEDURE — 83690 ASSAY OF LIPASE: CPT

## 2023-07-03 PROCEDURE — 74176 CT ABD & PELVIS W/O CONTRAST: CPT

## 2023-07-03 PROCEDURE — 81001 URINALYSIS AUTO W/SCOPE: CPT

## 2023-07-03 PROCEDURE — 6360000002 HC RX W HCPCS: Performed by: EMERGENCY MEDICINE

## 2023-07-03 PROCEDURE — 80053 COMPREHEN METABOLIC PANEL: CPT

## 2023-07-03 PROCEDURE — 96361 HYDRATE IV INFUSION ADD-ON: CPT

## 2023-07-03 PROCEDURE — 36415 COLL VENOUS BLD VENIPUNCTURE: CPT

## 2023-07-03 PROCEDURE — 81025 URINE PREGNANCY TEST: CPT

## 2023-07-03 PROCEDURE — 85025 COMPLETE CBC W/AUTO DIFF WBC: CPT

## 2023-07-03 RX ORDER — IBUPROFEN 800 MG/1
800 TABLET ORAL EVERY 6 HOURS PRN
Qty: 20 TABLET | Refills: 0 | Status: SHIPPED | OUTPATIENT
Start: 2023-07-03 | End: 2023-07-03 | Stop reason: SDUPTHER

## 2023-07-03 RX ORDER — 0.9 % SODIUM CHLORIDE 0.9 %
1000 INTRAVENOUS SOLUTION INTRAVENOUS
Status: COMPLETED | OUTPATIENT
Start: 2023-07-03 | End: 2023-07-03

## 2023-07-03 RX ORDER — METHOCARBAMOL 750 MG/1
750 TABLET, FILM COATED ORAL 3 TIMES DAILY
Qty: 15 TABLET | Refills: 0 | Status: SHIPPED | OUTPATIENT
Start: 2023-07-03 | End: 2023-07-03 | Stop reason: SDUPTHER

## 2023-07-03 RX ORDER — METHOCARBAMOL 750 MG/1
750 TABLET, FILM COATED ORAL 3 TIMES DAILY
Qty: 15 TABLET | Refills: 0 | Status: SHIPPED | OUTPATIENT
Start: 2023-07-03 | End: 2023-07-08

## 2023-07-03 RX ORDER — IBUPROFEN 800 MG/1
800 TABLET ORAL EVERY 6 HOURS PRN
Qty: 20 TABLET | Refills: 0 | Status: SHIPPED | OUTPATIENT
Start: 2023-07-03

## 2023-07-03 RX ORDER — KETOROLAC TROMETHAMINE 30 MG/ML
30 INJECTION, SOLUTION INTRAMUSCULAR; INTRAVENOUS ONCE
Status: COMPLETED | OUTPATIENT
Start: 2023-07-03 | End: 2023-07-03

## 2023-07-03 RX ADMIN — KETOROLAC TROMETHAMINE 30 MG: 30 INJECTION, SOLUTION INTRAMUSCULAR; INTRAVENOUS at 12:49

## 2023-07-03 RX ADMIN — SODIUM CHLORIDE 1000 ML: 9 INJECTION, SOLUTION INTRAVENOUS at 12:49

## 2023-07-03 ASSESSMENT — ENCOUNTER SYMPTOMS
VOMITING: 0
NAUSEA: 0
ABDOMINAL PAIN: 0

## 2023-07-03 ASSESSMENT — PAIN SCALES - GENERAL
PAINLEVEL_OUTOF10: 10
PAINLEVEL_OUTOF10: 10

## 2023-07-03 NOTE — ED PROVIDER NOTES
NEG      Leukocyte Esterase, Urine Negative NEG      Urine Culture if Indicated CULTURE NOT INDICATED BY UA RESULT CNI      WBC, UA 0-4 0 - 4 /hpf    RBC, UA 0-5 0 - 5 /hpf    Epithelial Cells UA MODERATE (A) FEW /lpf    BACTERIA, URINE Negative NEG /hpf    Hyaline Casts, UA 0-2 0 - 2 /lpf   POC Pregnancy Urine Qual    Collection Time: 07/03/23 12:13 PM   Result Value Ref Range    Preg Test, Ur Negative NEG     CBC with Diff    Collection Time: 07/03/23 12:18 PM   Result Value Ref Range    WBC 7.4 3.6 - 11.0 K/uL    RBC 4.89 3.80 - 5.20 M/uL    Hemoglobin 13.8 11.5 - 16.0 g/dL    Hematocrit 43.6 35.0 - 47.0 %    MCV 89.2 80.0 - 99.0 FL    MCH 28.2 26.0 - 34.0 PG    MCHC 31.7 30.0 - 36.5 g/dL    RDW 13.0 11.5 - 14.5 %    Platelets 295 857 - 918 K/uL    MPV 10.6 8.9 - 12.9 FL    Nucleated RBCs 0.0 0  WBC    nRBC 0.00 0.00 - 0.01 K/uL    Neutrophils % 53 32 - 75 %    Lymphocytes % 38 12 - 49 %    Monocytes % 5 5 - 13 %    Eosinophils % 3 0 - 7 %    Basophils % 1 0 - 1 %    Immature Granulocytes 0 0.0 - 0.5 %    Neutrophils Absolute 3.9 1.8 - 8.0 K/UL    Lymphocytes Absolute 2.8 0.8 - 3.5 K/UL    Monocytes Absolute 0.3 0.0 - 1.0 K/UL    Eosinophils Absolute 0.2 0.0 - 0.4 K/UL    Basophils Absolute 0.1 0.0 - 0.1 K/UL    Absolute Immature Granulocyte 0.0 0.00 - 0.04 K/UL    Differential Type AUTOMATED     CMP    Collection Time: 07/03/23 12:18 PM   Result Value Ref Range    Sodium 138 136 - 145 mmol/L    Potassium 3.8 3.5 - 5.1 mmol/L    Chloride 105 97 - 108 mmol/L    CO2 29 21 - 32 mmol/L    Anion Gap 4 (L) 5 - 15 mmol/L    Glucose 101 (H) 65 - 100 mg/dL    BUN 11 6 - 20 MG/DL    Creatinine 0.91 0.55 - 1.02 MG/DL    Bun/Cre Ratio 12 12 - 20      Est, Glom Filt Rate >60 >60 ml/min/1.73m2    Calcium 8.9 8.5 - 10.1 MG/DL    Total Bilirubin 0.4 0.2 - 1.0 MG/DL    ALT 28 12 - 78 U/L    AST 26 15 - 37 U/L    Alk Phosphatase 100 45 - 117 U/L    Total Protein 8.3 (H) 6.4 - 8.2 g/dL    Albumin 3.8 3.5 - 5.0 g/dL

## 2023-07-03 NOTE — ED NOTES
This rn at bedside for dc, went over all dc papers with pt prior to dc, pt verbalized understanding. Pt was ambulatory at time of dc.       Shantell Beltrán RN  07/03/23 [id number]

## 2023-07-03 NOTE — DISCHARGE INSTRUCTIONS
It was a pleasure taking care of you at [facility] Emergency Department today. We know that when you come to [facility], you are entrusting us with your health, comfort, and safety. Our physicians and nurses honor that trust, and we truly appreciate the opportunity to care for you and your loved ones. We also value your feedback. If you receive a survey about your Emergency Department experience today, please fill it out. We care about our patients' feedback, and we listen to what you have to say. Thank you!

## (undated) DEVICE — FCPS RAD JAW 4LC 240CM W/NDL -- BX/20 RADIAL JAW 4

## (undated) DEVICE — THE ENDO CARRY-ON PROCEDURE KIT CONTAINS ALL OF THE SUPPLIES AND INFECTION PREVENTION PRODUCTS NEEDED FOR ENDOSCOPIC PROCEDURES: Brand: ENDO CARRY-ON PROCEDURE KIT

## (undated) DEVICE — BLOCK BITE STD GRN ORAL AD W/O STRP SLD PLAS DISP BITEGARD